# Patient Record
Sex: FEMALE | Race: WHITE | Employment: OTHER | URBAN - METROPOLITAN AREA
[De-identification: names, ages, dates, MRNs, and addresses within clinical notes are randomized per-mention and may not be internally consistent; named-entity substitution may affect disease eponyms.]

---

## 2017-04-07 ENCOUNTER — TELEPHONE (OUTPATIENT)
Dept: FAMILY MEDICINE CLINIC | Age: 67
End: 2017-04-07

## 2019-04-09 ENCOUNTER — OFFICE VISIT (OUTPATIENT)
Dept: FAMILY MEDICINE CLINIC | Age: 69
End: 2019-04-09

## 2019-04-09 VITALS
SYSTOLIC BLOOD PRESSURE: 122 MMHG | DIASTOLIC BLOOD PRESSURE: 64 MMHG | HEART RATE: 80 BPM | BODY MASS INDEX: 39.92 KG/M2 | RESPIRATION RATE: 14 BRPM | HEIGHT: 59 IN | WEIGHT: 198 LBS

## 2019-04-09 DIAGNOSIS — J20.9 ACUTE BRONCHITIS WITH BRONCHOSPASM: ICD-10-CM

## 2019-04-09 DIAGNOSIS — J01.20 ACUTE NON-RECURRENT ETHMOIDAL SINUSITIS: Primary | ICD-10-CM

## 2019-04-09 PROCEDURE — 4040F PNEUMOC VAC/ADMIN/RCVD: CPT | Performed by: FAMILY MEDICINE

## 2019-04-09 PROCEDURE — 99213 OFFICE O/P EST LOW 20 MIN: CPT | Performed by: FAMILY MEDICINE

## 2019-04-09 PROCEDURE — 1090F PRES/ABSN URINE INCON ASSESS: CPT | Performed by: FAMILY MEDICINE

## 2019-04-09 PROCEDURE — 1036F TOBACCO NON-USER: CPT | Performed by: FAMILY MEDICINE

## 2019-04-09 PROCEDURE — 3017F COLORECTAL CA SCREEN DOC REV: CPT | Performed by: FAMILY MEDICINE

## 2019-04-09 PROCEDURE — G8417 CALC BMI ABV UP PARAM F/U: HCPCS | Performed by: FAMILY MEDICINE

## 2019-04-09 PROCEDURE — G8400 PT W/DXA NO RESULTS DOC: HCPCS | Performed by: FAMILY MEDICINE

## 2019-04-09 PROCEDURE — G8427 DOCREV CUR MEDS BY ELIG CLIN: HCPCS | Performed by: FAMILY MEDICINE

## 2019-04-09 PROCEDURE — 1123F ACP DISCUSS/DSCN MKR DOCD: CPT | Performed by: FAMILY MEDICINE

## 2019-04-09 RX ORDER — AZITHROMYCIN 250 MG/1
250 TABLET, FILM COATED ORAL SEE ADMIN INSTRUCTIONS
Qty: 6 TABLET | Refills: 0 | Status: SHIPPED | OUTPATIENT
Start: 2019-04-09 | End: 2019-04-14

## 2019-04-09 RX ORDER — BENZONATATE 200 MG/1
200 CAPSULE ORAL 3 TIMES DAILY PRN
Qty: 30 CAPSULE | Refills: 0 | Status: SHIPPED | OUTPATIENT
Start: 2019-04-09 | End: 2019-04-16

## 2019-04-09 RX ORDER — FLUTICASONE FUROATE AND VILANTEROL 200; 25 UG/1; UG/1
POWDER RESPIRATORY (INHALATION)
Qty: 1 EACH | Refills: 0
Start: 2019-04-09 | End: 2020-05-21 | Stop reason: ALTCHOICE

## 2019-04-09 ASSESSMENT — ENCOUNTER SYMPTOMS
SORE THROAT: 0
CHEST TIGHTNESS: 0
EYE PAIN: 0
WHEEZING: 1
CONSTIPATION: 0
COUGH: 1
NAUSEA: 0
SHORTNESS OF BREATH: 0
ABDOMINAL PAIN: 0

## 2019-04-09 ASSESSMENT — PATIENT HEALTH QUESTIONNAIRE - PHQ9
1. LITTLE INTEREST OR PLEASURE IN DOING THINGS: 0
2. FEELING DOWN, DEPRESSED OR HOPELESS: 0
SUM OF ALL RESPONSES TO PHQ QUESTIONS 1-9: 0
SUM OF ALL RESPONSES TO PHQ QUESTIONS 1-9: 0
SUM OF ALL RESPONSES TO PHQ9 QUESTIONS 1 & 2: 0

## 2019-04-09 NOTE — PROGRESS NOTES
Subjective:      Patient ID: Vanda Kramer is a 71 y.o. female. Cough   This is a new problem. Episode onset: 4 to  5  days  The problem has been gradually worsening. The cough is productive of sputum ( clear  thick  sputum). Associated symptoms include ear congestion, nasal congestion, postnasal drip and wheezing. Pertinent negatives include no chest pain, ear pain, fever, headaches, rash, sore throat or shortness of breath. Associated symptoms comments:   Throat  irritated . She has tried OTC cough suppressant for the symptoms. The treatment provided mild relief. Review of Systems   Constitutional: Negative for appetite change, fatigue and fever. HENT: Positive for postnasal drip. Negative for congestion, ear pain and sore throat. Eyes: Negative for pain and visual disturbance. Respiratory: Positive for cough and wheezing. Negative for chest tightness and shortness of breath. Cardiovascular: Negative for chest pain, palpitations and leg swelling. Gastrointestinal: Negative for abdominal pain, constipation and nausea. Genitourinary: Negative for dysuria and frequency. Musculoskeletal: Negative for arthralgias, joint swelling, neck pain and neck stiffness. Skin: Negative for rash. Neurological: Negative for dizziness, weakness, numbness and headaches. Hematological: Negative for adenopathy. Does not bruise/bleed easily. Psychiatric/Behavioral: Negative for behavioral problems and sleep disturbance. The patient is not nervous/anxious.       Past Medical History:   Diagnosis Date    Aortic regurgitation     Diverticulosis of colon     Gastritis     GERD (gastroesophageal reflux disease)     IBS (irritable bowel syndrome)     Seasonal allergies     TMJ (dislocation of temporomandibular joint)    /64 (Site: Right Upper Arm, Position: Sitting, Cuff Size: Medium Adult)   Pulse 80   Resp 14   Ht 4' 11\" (1.499 m)   Wt 198 lb (89.8 kg)   BMI 39.99 kg/m²   Objective: Physical Exam   Constitutional: She is oriented to person, place, and time. She appears well-developed and well-nourished. HENT:   Head: Normocephalic and atraumatic. Right Ear: External ear normal.   Left Ear: External ear normal.   Nose: Nose normal.   Mouth/Throat: Oropharynx is clear and moist.   Ears are normal nasal admission congestion redness of the throat   Eyes: Pupils are equal, round, and reactive to light. Conjunctivae and EOM are normal. No scleral icterus. Neck: Normal range of motion. Neck supple. No JVD present. No thyromegaly present. Cardiovascular: Normal rate, regular rhythm, normal heart sounds and intact distal pulses. Pulmonary/Chest: Effort normal and breath sounds normal. She has no wheezes. She has no rales. Sensory clear with no wheezes although upper anteriorly there is some rhonchi that clears with cough   Abdominal: Soft. Bowel sounds are normal. She exhibits no distension and no mass. There is no tenderness. Musculoskeletal: Normal range of motion. She exhibits no tenderness. Lymphadenopathy:     She has no cervical adenopathy. Neurological: She is alert and oriented to person, place, and time. She has normal reflexes. No cranial nerve deficit. Skin: Skin is warm and dry. No rash noted. Psychiatric: She has a normal mood and affect. Nursing note and vitals reviewed. Assessment:       Diagnosis Orders   1. Acute non-recurrent ethmoidal sinusitis     2.  Acute bronchitis with bronchospasm           Plan:      Current Outpatient Medications   Medication Sig Dispense Refill    vitamin D (CHOLECALCIFEROL) 1000 UNIT TABS tablet Take 1,000 Units by mouth daily      benzonatate (TESSALON) 200 MG capsule Take 1 capsule by mouth 3 times daily as needed for Cough 30 capsule 0    Fluticasone Furoate-Vilanterol (BREO ELLIPTA) 200-25 MCG/INH AEPB One  Inhalation once a day 1 each 0    azithromycin (ZITHROMAX) 250 MG tablet Take 1 tablet by mouth See Admin Instructions for 5 days 500mg on day 1 followed by 250mg on days 2 - 5 6 tablet 0    Multiple Vitamins-Minerals (THERAPEUTIC MULTIVITAMIN-MINERALS) tablet Take 1 tablet by mouth daily.  calcium carbonate (OSCAL) 500 MG TABS tablet Take 500 mg by mouth daily.  Omega-3 Fatty Acids (FISH OIL) 1000 MG CAPS Take 3,000 mg by mouth 3 times daily.  Cinnamon 500 MG CAPS Take  by mouth.  Probiotic Product (PROBIOTIC & ACIDOPHILUS EX ST PO) Take  by mouth.  loratadine (CLARITIN) 10 MG tablet Take 10 mg by mouth daily as needed. No current facility-administered medications for this visit.           Sudafed  30 mg    For  congestion    Barbra Cabral MD

## 2019-09-06 ENCOUNTER — TELEPHONE (OUTPATIENT)
Dept: FAMILY MEDICINE CLINIC | Age: 69
End: 2019-09-06

## 2019-09-06 RX ORDER — AZITHROMYCIN 250 MG/1
TABLET, FILM COATED ORAL
Qty: 1 PACKET | Refills: 0 | Status: SHIPPED | OUTPATIENT
Start: 2019-09-06 | End: 2020-05-21 | Stop reason: ALTCHOICE

## 2020-05-21 ENCOUNTER — VIRTUAL VISIT (OUTPATIENT)
Dept: FAMILY MEDICINE CLINIC | Age: 70
End: 2020-05-21

## 2020-05-21 VITALS — HEIGHT: 59 IN | WEIGHT: 198 LBS | BODY MASS INDEX: 39.92 KG/M2

## 2020-05-21 PROCEDURE — G8400 PT W/DXA NO RESULTS DOC: HCPCS | Performed by: FAMILY MEDICINE

## 2020-05-21 PROCEDURE — 99213 OFFICE O/P EST LOW 20 MIN: CPT | Performed by: FAMILY MEDICINE

## 2020-05-21 PROCEDURE — 1090F PRES/ABSN URINE INCON ASSESS: CPT | Performed by: FAMILY MEDICINE

## 2020-05-21 PROCEDURE — G8427 DOCREV CUR MEDS BY ELIG CLIN: HCPCS | Performed by: FAMILY MEDICINE

## 2020-05-21 PROCEDURE — G8417 CALC BMI ABV UP PARAM F/U: HCPCS | Performed by: FAMILY MEDICINE

## 2020-05-21 PROCEDURE — 3017F COLORECTAL CA SCREEN DOC REV: CPT | Performed by: FAMILY MEDICINE

## 2020-05-21 PROCEDURE — 1123F ACP DISCUSS/DSCN MKR DOCD: CPT | Performed by: FAMILY MEDICINE

## 2020-05-21 PROCEDURE — 1036F TOBACCO NON-USER: CPT | Performed by: FAMILY MEDICINE

## 2020-05-21 PROCEDURE — 4040F PNEUMOC VAC/ADMIN/RCVD: CPT | Performed by: FAMILY MEDICINE

## 2020-05-21 RX ORDER — PYRIDOXINE HCL (VITAMIN B6) 100 MG
50 TABLET ORAL DAILY
COMMUNITY

## 2020-05-21 RX ORDER — LANOLIN ALCOHOL/MO/W.PET/CERES
1000 CREAM (GRAM) TOPICAL DAILY
Status: ON HOLD | COMMUNITY
End: 2022-02-04 | Stop reason: HOSPADM

## 2020-05-21 RX ORDER — DOXYCYCLINE HYCLATE 100 MG
100 TABLET ORAL 2 TIMES DAILY
Qty: 20 TABLET | Refills: 0 | Status: SHIPPED | OUTPATIENT
Start: 2020-05-21 | End: 2020-05-31

## 2020-05-21 ASSESSMENT — PATIENT HEALTH QUESTIONNAIRE - PHQ9
SUM OF ALL RESPONSES TO PHQ9 QUESTIONS 1 & 2: 0
2. FEELING DOWN, DEPRESSED OR HOPELESS: 0
SUM OF ALL RESPONSES TO PHQ QUESTIONS 1-9: 0
SUM OF ALL RESPONSES TO PHQ QUESTIONS 1-9: 0
1. LITTLE INTEREST OR PLEASURE IN DOING THINGS: 0

## 2020-05-21 ASSESSMENT — ENCOUNTER SYMPTOMS
WHEEZING: 0
NAUSEA: 0
EYE PAIN: 0
ABDOMINAL PAIN: 0
COUGH: 0
CONSTIPATION: 0
CHEST TIGHTNESS: 0
SORE THROAT: 0
SHORTNESS OF BREATH: 0

## 2020-05-21 NOTE — PROGRESS NOTES
nervous/anxious. Objective:   Physical Exam  Constitutional:       General: She is not in acute distress. Appearance: She is normal weight. She is not ill-appearing. Musculoskeletal: Normal range of motion. Skin:     Comments:     Right   axillary    Area  with  Rash and  redness and  Raised  middle and  then red  But  No  Bulls eye    Neurological:      General: No focal deficit present. Mental Status: She is alert and oriented to person, place, and time. Cranial Nerves: No cranial nerve deficit. Sensory: No sensory deficit. Motor: No weakness. Coordination: Coordination normal.         Assessment:       Diagnosis Orders   1. Insect bite of right front wall of thorax, initial encounter  doxycycline hyclate (VIBRA-TABS) 100 MG tablet         Plan:      Current Outpatient Medications   Medication Sig Dispense Refill    pyridoxine (B-6) 100 MG tablet Take 50 mg by mouth daily      vitamin B-12 (CYANOCOBALAMIN) 1000 MCG tablet Take 1,000 mcg by mouth daily      Multiple Vitamins-Minerals (EYE VITAMINS PO) Take 1 tablet by mouth daily      Turmeric Curcumin 500 MG CAPS Take 1 capsule by mouth daily      doxycycline hyclate (VIBRA-TABS) 100 MG tablet Take 1 tablet by mouth 2 times daily for 10 days 20 tablet 0    vitamin D (CHOLECALCIFEROL) 1000 UNIT TABS tablet Take 1,000 Units by mouth daily      Multiple Vitamins-Minerals (THERAPEUTIC MULTIVITAMIN-MINERALS) tablet Take 1 tablet by mouth daily.  calcium carbonate (OSCAL) 500 MG TABS tablet Take 500 mg by mouth daily.  Omega-3 Fatty Acids (FISH OIL) 1000 MG CAPS Take 3,000 mg by mouth 3 times daily.  Probiotic Product (PROBIOTIC & ACIDOPHILUS EX ST PO) Take  by mouth.  loratadine (CLARITIN) 10 MG tablet Take 10 mg by mouth daily as needed. No current facility-administered medications for this visit. No orders of the defined types were placed in this encounter.       See if  Persist and Take doxycycline   Roger Patterson MD

## 2020-09-21 ENCOUNTER — OFFICE VISIT (OUTPATIENT)
Dept: FAMILY MEDICINE CLINIC | Age: 70
End: 2020-09-21

## 2020-09-21 VITALS
TEMPERATURE: 96.8 F | WEIGHT: 197 LBS | DIASTOLIC BLOOD PRESSURE: 74 MMHG | BODY MASS INDEX: 39.72 KG/M2 | SYSTOLIC BLOOD PRESSURE: 136 MMHG | RESPIRATION RATE: 14 BRPM | HEIGHT: 59 IN | HEART RATE: 84 BPM

## 2020-09-21 PROBLEM — E66.01 MORBIDLY OBESE (HCC): Status: ACTIVE | Noted: 2020-09-21

## 2020-09-21 PROCEDURE — 4040F PNEUMOC VAC/ADMIN/RCVD: CPT | Performed by: FAMILY MEDICINE

## 2020-09-21 PROCEDURE — 1123F ACP DISCUSS/DSCN MKR DOCD: CPT | Performed by: FAMILY MEDICINE

## 2020-09-21 PROCEDURE — 3017F COLORECTAL CA SCREEN DOC REV: CPT | Performed by: FAMILY MEDICINE

## 2020-09-21 PROCEDURE — 99213 OFFICE O/P EST LOW 20 MIN: CPT | Performed by: FAMILY MEDICINE

## 2020-09-21 PROCEDURE — G0438 PPPS, INITIAL VISIT: HCPCS | Performed by: FAMILY MEDICINE

## 2020-09-21 RX ORDER — MOMETASONE FUROATE 1 MG/G
CREAM TOPICAL
Qty: 45 G | Refills: 1 | Status: ON HOLD | OUTPATIENT
Start: 2020-09-21 | End: 2022-02-04 | Stop reason: HOSPADM

## 2020-09-21 ASSESSMENT — ENCOUNTER SYMPTOMS
CHEST TIGHTNESS: 0
COUGH: 0
WHEEZING: 0
NAUSEA: 0
CONSTIPATION: 0
EYE PAIN: 0
ABDOMINAL PAIN: 0
SHORTNESS OF BREATH: 0
SORE THROAT: 0

## 2020-09-21 ASSESSMENT — PATIENT HEALTH QUESTIONNAIRE - PHQ9
2. FEELING DOWN, DEPRESSED OR HOPELESS: 0
1. LITTLE INTEREST OR PLEASURE IN DOING THINGS: 0
SUM OF ALL RESPONSES TO PHQ QUESTIONS 1-9: 0
SUM OF ALL RESPONSES TO PHQ9 QUESTIONS 1 & 2: 0
SUM OF ALL RESPONSES TO PHQ QUESTIONS 1-9: 0

## 2020-09-21 ASSESSMENT — LIFESTYLE VARIABLES: HOW OFTEN DO YOU HAVE A DRINK CONTAINING ALCOHOL: 0

## 2020-09-21 NOTE — PROGRESS NOTES
Medicare Annual Wellness Visit  Name: Dustin Matamoros Date: 2020   MRN: [id number] Sex: Female   Age: 79 y.o. Ethnicity: Non-/Non    : 1950 Race: Jayda Acosta is here for Medicare AWV    Screenings for behavioral, psychosocial and functional/safety risks, and cognitive dysfunction are all negative except as indicated below. These results, as well as other patient data from the [address] form, are documented in Flowsheets linked to this Encounter. Allergies   Allergen Reactions    Pcn [Penicillins] Hives    Shellfish-Derived Products Diarrhea and Nausea And Vomiting       Prior to Visit Medications    Medication Sig Taking? Authorizing Provider   pyridoxine (B-6) 100 MG tablet Take 50 mg by mouth daily Yes Historical Provider, MD   vitamin B-12 (CYANOCOBALAMIN) 1000 MCG tablet Take 1,000 mcg by mouth daily Yes Historical Provider, MD   Multiple Vitamins-Minerals (EYE VITAMINS PO) Take 1 tablet by mouth daily Yes Historical Provider, MD   Turmeric Curcumin 500 MG CAPS Take 1 capsule by mouth daily Yes Historical Provider, MD   vitamin D (CHOLECALCIFEROL) 1000 UNIT TABS tablet Take 1,000 Units by mouth daily Yes Historical Provider, MD   Multiple Vitamins-Minerals (THERAPEUTIC MULTIVITAMIN-MINERALS) tablet Take 1 tablet by mouth daily. Yes Historical Provider, MD   calcium carbonate (OSCAL) 500 MG TABS tablet Take 500 mg by mouth daily. Yes Historical Provider, MD   Omega-3 Fatty Acids (FISH OIL) 1000 MG CAPS Take 3,000 mg by mouth 3 times daily. Yes Historical Provider, MD   Probiotic Product (PROBIOTIC & ACIDOPHILUS EX ST PO) Take  by mouth. Yes Historical Provider, MD   loratadine (CLARITIN) 10 MG tablet Take 10 mg by mouth daily as needed.    Yes Historical Provider, MD       Past Medical History:   Diagnosis Date    Aortic regurgitation     Diverticulosis of colon     Gastritis     GERD (gastroesophageal reflux disease)     IBS (irritable bowel syndrome)     Seasonal allergies     TMJ (dislocation of temporomandibular joint)        Past Surgical History:   Procedure Laterality Date    APPENDECTOMY  1971     SECTION       SECTION  1979    COLONOSCOPY  2007    [place]    HYSTERECTOMY  1994    bso    MANDIBLE SURGERY         Family History   Problem Relation Age of Onset    Glaucoma Mother     Stroke Father     Cancer Father         colon       CareTeam (Including outside providers/suppliers regularly involved in providing care):   Patient Care Team:  Yifan Manrique MD as PCP - General (Family Medicine)  Yifan Manrique MD as PCP - [facility] Empaneled Provider    Wt Readings from Last 3 Encounters:   20 197 lb (89.4 kg)   20 198 lb (89.8 kg)   19 198 lb (89.8 kg)     Vitals:    20 1118   BP: 136/74   Site: Right Upper Arm   Position: Sitting   Cuff Size: Medium Adult   Pulse: 84   Resp: 14   Temp: 96.8 °F (36 °C)   TempSrc: Oral   Weight: 197 lb (89.4 kg)   Height: 4' 11\" (1.499 m)     Body mass index is 39.79 kg/m². Based upon direct observation of the patient, evaluation of cognition reveals recent and remote memory intact. Patient's complete Health Risk Assessment and screening values have been reviewed and are found in Flowsheets. The following problems were reviewed today and where indicated follow up appointments were made and/or referrals ordered. Positive Risk Factor Screenings with Interventions:     Health Habits/Nutrition:  Health Habits/Nutrition  Do you exercise for at least 20 minutes 2-3 times per week?: Yes  Have you lost any weight without trying in the past 3 months?: (!) Yes  Do you eat fewer than 2 meals per day?: No  Have you seen a dentist within the past year?: Yes  Body mass index is 39.79 kg/m².   Health Habits/Nutrition Interventions:  · activity  noted     Safety:  Safety  Do you have working smoke detectors?: Yes  Have all throw rugs been removed or fastened?: (!) No  Do you have non-slip mats or surfaces in all bathtubs/showers?: (!) No  Do all of your stairways have a railing or banister?: Yes  Are your doorways, halls and stairs free of clutter?: Yes  Do you always fasten your seatbelt when you are in a car?: Yes  Safety Interventions:  · Home safety tips provided  Need  Non slip    Personalized Preventive Plan   Current Health Maintenance Status  Immunization History   Administered Date(s) Administered    Hepatitis A 2013, 10/10/2013    Hepatitis B 2013, 2013, 10/10/2013    Tdap (Boostrix, Adacel) 2013        Health Maintenance   Topic Date Due    Hepatitis C screen  1950    Diabetes screen  1990    Breast cancer screen  2000    Shingles Vaccine (1 of 2) 2000    DEXA (modify frequency per FRAX score)  2005    Pneumococcal 65+ years Vaccine (1 of 1 - PPSV23) 2015    Colon Cancer Screen FIT/FOBT  2015    Lipid screen  2018    Annual Wellness Visit (AWV)  2019    Flu vaccine (1) 2020    DTaP/Tdap/Td vaccine (2 - Td) 2023    Hepatitis A vaccine  Aged Out    Hepatitis B vaccine  Aged Out    Hib vaccine  Aged Out    Meningococcal (ACWY) vaccine  Aged Out     Recommendations for [employer] Due: see orders and patient instructions/AVS.  . Recommended screening schedule for the next 5-10 years is provided to the patient in written form: see Patient Instructions/AVS.    There are no diagnoses linked to this encounter. dlclines  Mammogram and  Flu  Vaccine and fit test                2020     Savi Shultz (:  1950) is a 79 y.o. female, here for evaluation of the following medical concerns:    HPI    Does  mvi   Noted       Seasonal allergies  Sable a and  With  meds        gerd   Better       Review of Systems   Constitutional: Negative for appetite change, fatigue and fever.    HENT: Negative for congestion, ear pain, postnasal Smokeless tobacco: Never Used   Substance Use Topics    Alcohol use: No        Vitals:    09/21/20 1118   BP: 136/74   Site: Right Upper Arm   Position: Sitting   Cuff Size: Medium Adult   Pulse: 84   Resp: 14   Temp: 96.8 °F (36 °C)   TempSrc: Oral   Weight: 197 lb (89.4 kg)   Height: 4' 11\" (1.499 m)     Estimated body mass index is 39.79 kg/m² as calculated from the following:    Height as of this encounter: 4' 11\" (1.499 m). Weight as of this encounter: 197 lb (89.4 kg). Physical Exam  Vitals signs and nursing note reviewed. Constitutional:       Appearance: She is well-developed. HENT:      Head: Normocephalic and atraumatic. Right Ear: External ear normal.      Left Ear: External ear normal.      Nose: Nose normal.   Eyes:      General: No scleral icterus. Conjunctiva/sclera: Conjunctivae normal.      Pupils: Pupils are equal, round, and reactive to light. Neck:      Musculoskeletal: Normal range of motion and neck supple. Thyroid: No thyromegaly. Vascular: No JVD. Cardiovascular:      Rate and Rhythm: Normal rate and regular rhythm. Heart sounds: Normal heart sounds. Pulmonary:      Effort: Pulmonary effort is normal.      Breath sounds: Normal breath sounds. No wheezing or rales. Abdominal:      General: Bowel sounds are normal. There is no distension. Palpations: Abdomen is soft. There is no mass. Tenderness: There is no abdominal tenderness. Musculoskeletal: Normal range of motion. General: No tenderness. Lymphadenopathy:      Cervical: No cervical adenopathy. Skin:     General: Skin is warm and dry. Findings: No rash. Neurological:      Mental Status: She is alert and oriented to person, place, and time. Cranial Nerves: No cranial nerve deficit. Deep Tendon Reflexes: Reflexes are normal and symmetric. ASSESSMENT/PLAN:     Diagnosis Orders   1. Diverticulosis of colon     2. Morbidly obese ([address]     3. Gastroesophageal reflux disease without esophagitis     4. Irritable bowel syndrome without diarrhea     5. Seasonal allergies     6. Dislocation of temporomandibular joint, sequela       There are no diagnoses linked to this encounter. No follow-ups on file. PLAn    Current Outpatient Medications   Medication Sig Dispense Refill    pyridoxine (B-6) 100 MG tablet Take 50 mg by mouth daily      vitamin B-12 (CYANOCOBALAMIN) 1000 MCG tablet Take 1,000 mcg by mouth daily      Multiple Vitamins-Minerals (EYE VITAMINS PO) Take 1 tablet by mouth daily      Turmeric Curcumin 500 MG CAPS Take 1 capsule by mouth daily      vitamin D (CHOLECALCIFEROL) 1000 UNIT TABS tablet Take 1,000 Units by mouth daily      Multiple Vitamins-Minerals (THERAPEUTIC MULTIVITAMIN-MINERALS) tablet Take 1 tablet by mouth daily.  calcium carbonate (OSCAL) 500 MG TABS tablet Take 500 mg by mouth daily.  Omega-3 Fatty Acids (FISH OIL) 1000 MG CAPS Take 3,000 mg by mouth 3 times daily.  Probiotic Product (PROBIOTIC & ACIDOPHILUS EX ST PO) Take  by mouth.  loratadine (CLARITIN) 10 MG tablet Take 10 mg by mouth daily as needed. No current facility-administered medications for this visit. Stable and  See in one  Year      Cream  For  Skin  Mometasone   An electronic signature was used to authenticate this note.     --Lyn Quesada MD on 9/21/2020 at 12:09 PM

## 2020-09-21 NOTE — PATIENT INSTRUCTIONS
Personalized Preventive Plan for Ana Beams - 9/21/2020  Medicare offers a range of preventive health benefits. Some of the tests and screenings are paid in full while other may be subject to a deductible, co-insurance, and/or copay. Some of these benefits include a comprehensive review of your medical history including lifestyle, illnesses that may run in your family, and various assessments and screenings as appropriate. After reviewing your medical record and screening and assessments performed today your provider may have ordered immunizations, labs, imaging, and/or referrals for you. A list of these orders (if applicable) as well as your Preventive Care list are included within your After Visit Summary for your review. Other Preventive Recommendations:    · A preventive eye exam performed by an eye specialist is recommended every 1-2 years to screen for glaucoma; cataracts, macular degeneration, and other eye disorders. · A preventive dental visit is recommended every 6 months. · Try to get at least 150 minutes of exercise per week or 10,000 steps per day on a pedometer . · Order or download the FREE \"Exercise & Physical Activity: Your Everyday Guide\" from [employer]. Call 0-777.944.4376 or search [employer] online. · You need 5194-8064 mg of calcium and 8678-3151 IU of vitamin D per day. It is possible to meet your calcium requirement with diet alone, but a vitamin D supplement is usually necessary to meet this goal.  · When exposed to the sun, use a sunscreen that protects against both UVA and UVB radiation with an SPF of 30 or greater. Reapply every 2 to 3 hours or after sweating, drying off with a towel, or swimming. · Always wear a seat belt when traveling in a car. Always wear a helmet when riding a bicycle or motorcycle.

## 2021-01-29 ENCOUNTER — TELEPHONE (OUTPATIENT)
Dept: FAMILY MEDICINE CLINIC | Age: 71
End: 2021-01-29

## 2021-01-29 NOTE — TELEPHONE ENCOUNTER
Pt called requesting Ivermectin 3 mg, parasitic preventative medicine. Traveling down [facility]. Also want Doxycycline Rx.       [facility]

## 2021-01-30 NOTE — TELEPHONE ENCOUNTER
Some  Info on ivermectin for covid  Is this what wanting and the same for  Doxycycline ?   With the ability to do  Virtual visits feel best to do if symptoms so can instruct appropriately if getting ill

## 2022-01-24 ENCOUNTER — VIRTUAL VISIT (OUTPATIENT)
Dept: FAMILY MEDICINE CLINIC | Age: 72
End: 2022-01-24

## 2022-01-24 DIAGNOSIS — R11.0 NAUSEA: Primary | ICD-10-CM

## 2022-01-24 DIAGNOSIS — J06.9 UPPER RESPIRATORY TRACT INFECTION DUE TO COVID-19 VIRUS: ICD-10-CM

## 2022-01-24 DIAGNOSIS — A09 DIARRHEA OF INFECTIOUS ORIGIN: ICD-10-CM

## 2022-01-24 DIAGNOSIS — U07.1 UPPER RESPIRATORY TRACT INFECTION DUE TO COVID-19 VIRUS: ICD-10-CM

## 2022-01-24 PROCEDURE — G8400 PT W/DXA NO RESULTS DOC: HCPCS | Performed by: FAMILY MEDICINE

## 2022-01-24 PROCEDURE — 1036F TOBACCO NON-USER: CPT | Performed by: FAMILY MEDICINE

## 2022-01-24 PROCEDURE — 99213 OFFICE O/P EST LOW 20 MIN: CPT | Performed by: FAMILY MEDICINE

## 2022-01-24 PROCEDURE — G8427 DOCREV CUR MEDS BY ELIG CLIN: HCPCS | Performed by: FAMILY MEDICINE

## 2022-01-24 RX ORDER — ONDANSETRON 4 MG/1
4 TABLET, FILM COATED ORAL 3 TIMES DAILY PRN
Qty: 21 TABLET | Refills: 0 | Status: SHIPPED | OUTPATIENT
Start: 2022-01-24 | End: 2022-03-24 | Stop reason: SDUPTHER

## 2022-01-24 SDOH — ECONOMIC STABILITY: FOOD INSECURITY: WITHIN THE PAST 12 MONTHS, YOU WORRIED THAT YOUR FOOD WOULD RUN OUT BEFORE YOU GOT MONEY TO BUY MORE.: NEVER TRUE

## 2022-01-24 SDOH — ECONOMIC STABILITY: FOOD INSECURITY: WITHIN THE PAST 12 MONTHS, THE FOOD YOU BOUGHT JUST DIDN'T LAST AND YOU DIDN'T HAVE MONEY TO GET MORE.: NEVER TRUE

## 2022-01-24 ASSESSMENT — PATIENT HEALTH QUESTIONNAIRE - PHQ9
1. LITTLE INTEREST OR PLEASURE IN DOING THINGS: 0
SUM OF ALL RESPONSES TO PHQ QUESTIONS 1-9: 0
SUM OF ALL RESPONSES TO PHQ9 QUESTIONS 1 & 2: 0
SUM OF ALL RESPONSES TO PHQ QUESTIONS 1-9: 0
2. FEELING DOWN, DEPRESSED OR HOPELESS: 0
SUM OF ALL RESPONSES TO PHQ QUESTIONS 1-9: 0
SUM OF ALL RESPONSES TO PHQ QUESTIONS 1-9: 0

## 2022-01-24 ASSESSMENT — ENCOUNTER SYMPTOMS
SORE THROAT: 0
COUGH: 1
CHEST TIGHTNESS: 0
EYE PAIN: 0
ABDOMINAL PAIN: 0
NAUSEA: 0
SHORTNESS OF BREATH: 0
CONSTIPATION: 0
WHEEZING: 0

## 2022-01-24 ASSESSMENT — SOCIAL DETERMINANTS OF HEALTH (SDOH): HOW HARD IS IT FOR YOU TO PAY FOR THE VERY BASICS LIKE FOOD, HOUSING, MEDICAL CARE, AND HEATING?: NOT HARD AT ALL

## 2022-01-24 NOTE — PROGRESS NOTES
Lisa Hammonds (:  1950) is a Established patient, here for evaluation of the following:    Assessment & Plan         Diagnosis Orders   1. Nausea  ondansetron (ZOFRAN) 4 MG tablet   2. Diarrhea of infectious origin  ondansetron (ZOFRAN) 4 MG tablet   3. Upper respiratory tract infection due to COVID-19 virus  ondansetron (ZOFRAN) 4 MG tablet       PLAN    Current Outpatient Medications   Medication Sig Dispense Refill    ondansetron (ZOFRAN) 4 MG tablet Take 1 tablet by mouth 3 times daily as needed for Nausea or Vomiting 21 tablet 0    mometasone (ELOCON) 0.1 % cream Apply topically daily. (Patient not taking: Reported on 2022) 45 g 1    pyridoxine (B-6) 100 MG tablet Take 50 mg by mouth daily (Patient not taking: Reported on 2022)      vitamin B-12 (CYANOCOBALAMIN) 1000 MCG tablet Take 1,000 mcg by mouth daily (Patient not taking: Reported on 2022)      Multiple Vitamins-Minerals (EYE VITAMINS PO) Take 1 tablet by mouth daily (Patient not taking: Reported on 2022)      Turmeric Curcumin 500 MG CAPS Take 1 capsule by mouth daily (Patient not taking: Reported on 2022)      vitamin D (CHOLECALCIFEROL) 1000 UNIT TABS tablet Take 1,000 Units by mouth daily (Patient not taking: Reported on 2022)      Multiple Vitamins-Minerals (THERAPEUTIC MULTIVITAMIN-MINERALS) tablet Take 1 tablet by mouth daily. (Patient not taking: Reported on 2022)      calcium carbonate (OSCAL) 500 MG TABS tablet Take 500 mg by mouth daily. (Patient not taking: Reported on 2022)      Omega-3 Fatty Acids (FISH OIL) 1000 MG CAPS Take 3,000 mg by mouth 3 times daily. (Patient not taking: Reported on 2022)      Probiotic Product (PROBIOTIC & ACIDOPHILUS EX ST PO) Take  by mouth. (Patient not taking: Reported on 2022)      loratadine (CLARITIN) 10 MG tablet Take 10 mg by mouth daily as needed.    (Patient not taking: Reported on 2022)       No current facility-administered medications for this visit. No orders of the defined types were placed in this encounter. Told  Stay  isolated  Until  More  Symptoms  resolve and  get  Pulse  Ox and  If less  90%  To  er     Subjective     Fever  noted  101.5  range    Cough  This is a new problem. The current episode started in the past 7 days (  one  weekand  home  test ). The problem has been waxing and waning. The cough is non-productive. Associated symptoms include a fever, nasal congestion and postnasal drip. Pertinent negatives include no chest pain, ear pain, headaches, rash, sore throat, shortness of breath or wheezing. The treatment provided mild relief. Past Medical History:   Diagnosis Date    Aortic regurgitation     Diverticulosis of colon     Gastritis     GERD (gastroesophageal reflux disease)     IBS (irritable bowel syndrome)     Seasonal allergies     TMJ (dislocation of temporomandibular joint)          Review of Systems   Constitutional: Positive for fever. Negative for appetite change and fatigue. HENT: Positive for postnasal drip. Negative for congestion, ear pain and sore throat. Eyes: Negative for pain and visual disturbance. Respiratory: Positive for cough. Negative for chest tightness, shortness of breath and wheezing. Cardiovascular: Negative for chest pain, palpitations and leg swelling. Gastrointestinal: Negative for abdominal pain, constipation and nausea. lost    10  Pounds a dn     Still  Diarrhea    Genitourinary: Negative for dysuria and frequency. Musculoskeletal: Negative for arthralgias, joint swelling, neck pain and neck stiffness. Skin: Negative for rash. Neurological: Negative for dizziness, weakness, numbness and headaches. Hematological: Negative for adenopathy. Does not bruise/bleed easily. Psychiatric/Behavioral: Negative for behavioral problems and sleep disturbance. The patient is not nervous/anxious.            Objective   Patient-Reported Vitals  No data recorded     Physical Exam  [INSTRUCTIONS:  \"[x]\" Indicates a positive item  \"[]\" Indicates a negative item  -- DELETE ALL ITEMS NOT EXAMINED]    Constitutional: [x] Appears well-developed and well-nourished [x] No apparent distress      [] Abnormal -     Mental status: [x] Alert and awake  [x] Oriented to person/place/time [x] Able to follow commands    [] Abnormal -     Eyes:   EOM    [x]  Normal    [] Abnormal -   Sclera  [x]  Normal    [] Abnormal -          Discharge [x]  None visible   [] Abnormal -     HENT: [x] Normocephalic, atraumatic  [] Abnormal -   [x] Mouth/Throat: Mucous membranes are moist    appears  Dehydrated and  With  Cough and  congestion  External Ears [x] Normal  [] Abnormal -    Neck: [x] No visualized mass [] Abnormal -     Pulmonary/Chest: [x] Respiratory effort normal   [x] No visualized signs of difficulty breathing or respiratory distress        [] Abnormal -      Musculoskeletal:   [x] Normal gait with no signs of ataxia         [x] Normal range of motion of neck        [] Abnormal -     Neurological:        [x] No Facial Asymmetry (Cranial nerve 7 motor function) (limited exam due to video visit)          [x] No gaze palsy        [] Abnormal -          Skin:        [x] No significant exanthematous lesions or discoloration noted on facial skin         [] Abnormal -            Psychiatric:       [x] Normal Affect [] Abnormal -        [x] No Hallucinations    Other pertinent observable physical exam findings:-         Sherry Martinez, was evaluated through a synchronous (real-time) audio-video encounter. The patient (or guardian if applicable) is aware that this is a billable service, which includes applicable co-pays. This Virtual Visit was conducted with patient's (and/or legal guardian's) consent.  The visit was conducted pursuant to the emergency declaration under the [facility]5 waiver authority and the [facility] and Response Supplemental Appropriations Act. Patient identification was verified, and a caregiver was present when appropriate. The patient was located at home in a state where the provider was licensed to provide care.        --Juan Carlisle MD

## 2022-01-24 NOTE — PROGRESS NOTES
Tres Lee agreed to Video Chat/Exam in presence of Dr Agustin Rosales and myself. Verified who was present in room with Tres Lee. Tres Lee informed the e-mail address used to Face Time cannot be used to contact the Provider, if they are any questions or concerns they need to call the office directly. Tres Lee stated understanding.

## 2022-01-31 ENCOUNTER — APPOINTMENT (OUTPATIENT)
Dept: GENERAL RADIOLOGY | Age: 72
DRG: 310 | End: 2022-01-31
Payer: MEDICARE

## 2022-01-31 ENCOUNTER — HOSPITAL ENCOUNTER (INPATIENT)
Age: 72
LOS: 2 days | Discharge: HOME | DRG: 310 | End: 2022-02-04
Attending: HOSPITALIST | Admitting: HOSPITALIST
Payer: MEDICARE

## 2022-01-31 ENCOUNTER — VIRTUAL VISIT (OUTPATIENT)
Dept: FAMILY MEDICINE CLINIC | Age: 72
End: 2022-01-31

## 2022-01-31 DIAGNOSIS — E86.0 DEHYDRATION: Primary | ICD-10-CM

## 2022-01-31 DIAGNOSIS — R53.1 GENERALIZED WEAKNESS: ICD-10-CM

## 2022-01-31 DIAGNOSIS — F34.1 DYSTHYMIA: ICD-10-CM

## 2022-01-31 DIAGNOSIS — R26.81 GAIT INSTABILITY: ICD-10-CM

## 2022-01-31 DIAGNOSIS — I48.0 PAROXYSMAL ATRIAL FIBRILLATION (HCC): ICD-10-CM

## 2022-01-31 DIAGNOSIS — R53.1 GENERAL WEAKNESS: ICD-10-CM

## 2022-01-31 DIAGNOSIS — E86.0 DEHYDRATION: ICD-10-CM

## 2022-01-31 DIAGNOSIS — J12.82 PNEUMONIA DUE TO COVID-19 VIRUS: ICD-10-CM

## 2022-01-31 DIAGNOSIS — U07.1 COVID-19 VIRUS INFECTION: Primary | ICD-10-CM

## 2022-01-31 DIAGNOSIS — U07.1 PNEUMONIA DUE TO COVID-19 VIRUS: ICD-10-CM

## 2022-01-31 LAB
ALBUMIN SERPL-MCNC: 3.1 G/DL (ref 3.5–5.1)
ALP BLD-CCNC: 59 U/L (ref 38–126)
ALT SERPL-CCNC: 30 U/L (ref 11–66)
ANION GAP SERPL CALCULATED.3IONS-SCNC: 12 MEQ/L (ref 8–16)
AST SERPL-CCNC: 41 U/L (ref 5–40)
BASOPHILS # BLD: 0.2 %
BASOPHILS ABSOLUTE: 0 THOU/MM3 (ref 0–0.1)
BILIRUB SERPL-MCNC: 0.4 MG/DL (ref 0.3–1.2)
BILIRUBIN URINE: NEGATIVE
BLOOD, URINE: NEGATIVE
BUN BLDV-MCNC: 22 MG/DL (ref 7–22)
CALCIUM SERPL-MCNC: 8.3 MG/DL (ref 8.5–10.5)
CHARACTER, URINE: CLEAR
CHLORIDE BLD-SCNC: 107 MEQ/L (ref 98–111)
CO2: 21 MEQ/L (ref 23–33)
COLOR: YELLOW
CREAT SERPL-MCNC: 0.9 MG/DL (ref 0.4–1.2)
EOSINOPHIL # BLD: 0.9 %
EOSINOPHILS ABSOLUTE: 0.1 THOU/MM3 (ref 0–0.4)
ERYTHROCYTE [DISTWIDTH] IN BLOOD BY AUTOMATED COUNT: 13.2 % (ref 11.5–14.5)
ERYTHROCYTE [DISTWIDTH] IN BLOOD BY AUTOMATED COUNT: 41.3 FL (ref 35–45)
GFR SERPL CREATININE-BSD FRML MDRD: 62 ML/MIN/1.73M2
GLUCOSE BLD-MCNC: 117 MG/DL (ref 70–108)
GLUCOSE URINE: NEGATIVE MG/DL
HCT VFR BLD CALC: 43.3 % (ref 37–47)
HEMOGLOBIN: 14.3 GM/DL (ref 12–16)
IMMATURE GRANS (ABS): 0.02 THOU/MM3 (ref 0–0.07)
IMMATURE GRANULOCYTES: 0.4 %
KETONES, URINE: NEGATIVE
LEUKOCYTE ESTERASE, URINE: NEGATIVE
LYMPHOCYTES # BLD: 16.8 %
LYMPHOCYTES ABSOLUTE: 1 THOU/MM3 (ref 1–4.8)
MCH RBC QN AUTO: 28.4 PG (ref 26–33)
MCHC RBC AUTO-ENTMCNC: 33 GM/DL (ref 32.2–35.5)
MCV RBC AUTO: 85.9 FL (ref 81–99)
MONOCYTES # BLD: 12.4 %
MONOCYTES ABSOLUTE: 0.7 THOU/MM3 (ref 0.4–1.3)
NITRITE, URINE: NEGATIVE
NUCLEATED RED BLOOD CELLS: 0 /100 WBC
OSMOLALITY CALCULATION: 283.8 MOSMOL/KG (ref 275–300)
PH UA: 6.5 (ref 5–9)
PLATELET # BLD: 243 THOU/MM3 (ref 130–400)
PMV BLD AUTO: 10.6 FL (ref 9.4–12.4)
POTASSIUM SERPL-SCNC: 4.1 MEQ/L (ref 3.5–5.2)
PROTEIN UA: NEGATIVE
RBC # BLD: 5.04 MILL/MM3 (ref 4.2–5.4)
REASON FOR REJECTION: NORMAL
REJECTED TEST: NORMAL
SEG NEUTROPHILS: 69.3 %
SEGMENTED NEUTROPHILS ABSOLUTE COUNT: 4 THOU/MM3 (ref 1.8–7.7)
SODIUM BLD-SCNC: 140 MEQ/L (ref 135–145)
SPECIFIC GRAVITY, URINE: < 1.005 (ref 1–1.03)
TOTAL PROTEIN: 6.6 G/DL (ref 6.1–8)
TROPONIN T: < 0.01 NG/ML
UROBILINOGEN, URINE: 0.2 EU/DL (ref 0–1)
WBC # BLD: 5.7 THOU/MM3 (ref 4.8–10.8)

## 2022-01-31 PROCEDURE — 96361 HYDRATE IV INFUSION ADD-ON: CPT

## 2022-01-31 PROCEDURE — 99213 OFFICE O/P EST LOW 20 MIN: CPT | Performed by: FAMILY MEDICINE

## 2022-01-31 PROCEDURE — 80053 COMPREHEN METABOLIC PANEL: CPT

## 2022-01-31 PROCEDURE — 85025 COMPLETE CBC W/AUTO DIFF WBC: CPT

## 2022-01-31 PROCEDURE — 96360 HYDRATION IV INFUSION INIT: CPT

## 2022-01-31 PROCEDURE — 84484 ASSAY OF TROPONIN QUANT: CPT

## 2022-01-31 PROCEDURE — 99285 EMERGENCY DEPT VISIT HI MDM: CPT

## 2022-01-31 PROCEDURE — 1036F TOBACCO NON-USER: CPT | Performed by: FAMILY MEDICINE

## 2022-01-31 PROCEDURE — 81003 URINALYSIS AUTO W/O SCOPE: CPT

## 2022-01-31 PROCEDURE — 2580000003 HC RX 258: Performed by: PHYSICIAN ASSISTANT

## 2022-01-31 PROCEDURE — G8427 DOCREV CUR MEDS BY ELIG CLIN: HCPCS | Performed by: FAMILY MEDICINE

## 2022-01-31 PROCEDURE — 87635 SARS-COV-2 COVID-19 AMP PRB: CPT

## 2022-01-31 PROCEDURE — 36415 COLL VENOUS BLD VENIPUNCTURE: CPT

## 2022-01-31 PROCEDURE — G8400 PT W/DXA NO RESULTS DOC: HCPCS | Performed by: FAMILY MEDICINE

## 2022-01-31 PROCEDURE — 93005 ELECTROCARDIOGRAM TRACING: CPT | Performed by: PHYSICIAN ASSISTANT

## 2022-01-31 PROCEDURE — 71045 X-RAY EXAM CHEST 1 VIEW: CPT

## 2022-01-31 RX ORDER — SODIUM CHLORIDE, SODIUM LACTATE, POTASSIUM CHLORIDE, CALCIUM CHLORIDE 600; 310; 30; 20 MG/100ML; MG/100ML; MG/100ML; MG/100ML
INJECTION, SOLUTION INTRAVENOUS ONCE
Status: COMPLETED | OUTPATIENT
Start: 2022-01-31 | End: 2022-01-31

## 2022-01-31 RX ORDER — 0.9 % SODIUM CHLORIDE 0.9 %
1000 INTRAVENOUS SOLUTION INTRAVENOUS ONCE
Status: COMPLETED | OUTPATIENT
Start: 2022-01-31 | End: 2022-01-31

## 2022-01-31 RX ADMIN — SODIUM CHLORIDE 1000 ML: 9 INJECTION, SOLUTION INTRAVENOUS at 19:27

## 2022-01-31 RX ADMIN — SODIUM CHLORIDE, POTASSIUM CHLORIDE, SODIUM LACTATE AND CALCIUM CHLORIDE: 600; 310; 30; 20 INJECTION, SOLUTION INTRAVENOUS at 22:05

## 2022-01-31 ASSESSMENT — ENCOUNTER SYMPTOMS
BACK PAIN: 0
RHINORRHEA: 0
VOMITING: 1
SHORTNESS OF BREATH: 0
PHOTOPHOBIA: 0
SORE THROAT: 0
NAUSEA: 1
CONSTIPATION: 0
SHORTNESS OF BREATH: 1
ABDOMINAL PAIN: 0
DIARRHEA: 1
WHEEZING: 0
SORE THROAT: 0
CONSTIPATION: 0
COUGH: 0
EYE PAIN: 0
COUGH: 1
NAUSEA: 0
CHEST TIGHTNESS: 0
ABDOMINAL PAIN: 0

## 2022-01-31 ASSESSMENT — PAIN SCALES - WONG BAKER: WONGBAKER_NUMERICALRESPONSE: 2

## 2022-01-31 ASSESSMENT — PAIN DESCRIPTION - LOCATION: LOCATION: EAR

## 2022-01-31 ASSESSMENT — PAIN DESCRIPTION - ORIENTATION: ORIENTATION: LEFT

## 2022-01-31 NOTE — ED TRIAGE NOTES
Pt presents to the ED from home via EMS with c/o fatigue and diarrhea. Pt states she took a home covid test last week and tested positive. Pt states she has been feeling nauseous and had a lack of appetite since last week. Pt states she has episodes of diarrhea and vomiting. Pt states she has not vomited recently but her appetite has not increased. Pt states she is still having diarrhea. Pt states \"everything just goes through me\". Pt denies any SOB or chest pain. Pt states her right ear began to hurt yesterday. Pt states the pain is intermittent. Pt is unable to describe how pain feels.  RR even and unlabored

## 2022-01-31 NOTE — PROGRESS NOTES
Elidia Marshall (:  1950) is a Established patient, here for evaluation of the following:    Assessment & Plan        Diagnosis Orders   1. COVID-19 virus infection     2. Dehydration           PLAN. Current Outpatient Medications   Medication Sig Dispense Refill    ondansetron (ZOFRAN) 4 MG tablet Take 1 tablet by mouth 3 times daily as needed for Nausea or Vomiting 21 tablet 0    mometasone (ELOCON) 0.1 % cream Apply topically daily. (Patient not taking: Reported on 2022) 45 g 1    pyridoxine (B-6) 100 MG tablet Take 50 mg by mouth daily (Patient not taking: Reported on 2022)      vitamin B-12 (CYANOCOBALAMIN) 1000 MCG tablet Take 1,000 mcg by mouth daily (Patient not taking: Reported on 2022)      Multiple Vitamins-Minerals (EYE VITAMINS PO) Take 1 tablet by mouth daily (Patient not taking: Reported on 2022)      Turmeric Curcumin 500 MG CAPS Take 1 capsule by mouth daily (Patient not taking: Reported on 2022)      vitamin D (CHOLECALCIFEROL) 1000 UNIT TABS tablet Take 1,000 Units by mouth daily (Patient not taking: Reported on 2022)      Multiple Vitamins-Minerals (THERAPEUTIC MULTIVITAMIN-MINERALS) tablet Take 1 tablet by mouth daily. (Patient not taking: Reported on 2022)      calcium carbonate (OSCAL) 500 MG TABS tablet Take 500 mg by mouth daily. (Patient not taking: Reported on 2022)      Omega-3 Fatty Acids (FISH OIL) 1000 MG CAPS Take 3,000 mg by mouth 3 times daily. (Patient not taking: Reported on 2022)      Probiotic Product (PROBIOTIC & ACIDOPHILUS EX ST PO) Take  by mouth. (Patient not taking: Reported on 2022)      loratadine (CLARITIN) 10 MG tablet Take 10 mg by mouth daily as needed. (Patient not taking: Reported on 2022)       No current facility-administered medications for this visit.          Dehydration and to  Er   For  eval and  Dehydration and  Concern  of  RUBY    Subjective   HPI  patient with Cob[place] and cold last week and nd dehydration. Minimal urine output and persistent weakness with no oral intake      Review of Systems   Constitutional: Negative for appetite change, fatigue and fever. HENT: Negative for congestion, ear pain, postnasal drip and sore throat. Eyes: Negative for pain and visual disturbance. Respiratory: Positive for shortness of breath. Negative for cough, chest tightness and wheezing. Cardiovascular: Negative for chest pain, palpitations and leg swelling. Gastrointestinal: Negative for abdominal pain, constipation and nausea. Genitourinary: Negative for dysuria and frequency. Musculoskeletal: Negative for arthralgias, joint swelling, neck pain and neck stiffness. Skin: Negative for rash. Neurological: Positive for weakness. Negative for dizziness, numbness and headaches. Hematological: Negative for adenopathy. Does not bruise/bleed easily. Psychiatric/Behavioral: Negative for behavioral problems and sleep disturbance. The patient is not nervous/anxious.            Objective   Patient-Reported Vitals  No data recorded     Physical Exam  [INSTRUCTIONS:  \"[x]\" Indicates a positive item  \"[]\" Indicates a negative item  -- DELETE ALL ITEMS NOT EXAMINED]    Constitutional: [x] Appears well-developed and well-nourished [x] No apparent distress      [] Abnormal -     Mental status: [x] Alert and awake  [x] Oriented to person/place/time [x] Able to follow commands    [] Abnormal -     Eyes:   EOM    [x]  Normal    [] Abnormal -   Sclera  [x]  Normal    [] Abnormal -          Discharge [x]  None visible   [] Abnormal -     HENT: [x] Normocephalic, atraumatic  [] Abnormal -   [x] Mouth/Throat: Mucous membranes are moist    External Ears [x] Normal  [] Abnormal -    Neck: [x] No visualized mass [] Abnormal -     Pulmonary/Chest: [x] Respiratory effort normal   [x] No visualized signs of difficulty breathing or respiratory distress        [] Abnormal -      Musculoskeletal:   [x] Normal gait with no signs of ataxia         [x] Normal range of motion of neck        [] Abnormal -     Neurological:        [x] No Facial Asymmetry (Cranial nerve 7 motor function) (limited exam due to video visit)          [x] No gaze palsy        [] Abnormal -         Weakness   Skin:        [x] No significant exanthematous lesions or discoloration noted on facial skin         [] Abnormal -            Psychiatric:       [x] Normal Affect [] Abnormal -        [x] No Hallucinations    Other pertinent observable physical exam findings:-                 Elidia Marshall, was evaluated through a synchronous (real-time) audio-video encounter. The patient (or guardian if applicable) is aware that this is a billable service, which includes applicable co-pays. This Virtual Visit was conducted with patient's (and/or legal guardian's) consent. The visit was conducted pursuant to the emergency declaration under the [address] [place] [address], [address] [place] [address] authority and the [employer] General Act. Patient identification was verified, and a caregiver was present when appropriate. The patient was located at home in a state where the provider was licensed to provide care.        --Saritha Coburn MD

## 2022-01-31 NOTE — ED NOTES
Bed: 019A  Expected date: 1/31/22  Expected time: 6:18 PM  Means of arrival:   Comments:  Michi Benson RN  01/31/22 5275

## 2022-02-01 PROBLEM — R53.1 GENERALIZED WEAKNESS: Status: ACTIVE | Noted: 2022-02-01

## 2022-02-01 LAB
ADENOVIRUS F 40 41 PCR: NOT DETECTED
ALBUMIN SERPL-MCNC: 2.8 G/DL (ref 3.5–5.1)
ALP BLD-CCNC: 62 U/L (ref 38–126)
ALT SERPL-CCNC: 27 U/L (ref 11–66)
ANION GAP SERPL CALCULATED.3IONS-SCNC: 12 MEQ/L (ref 8–16)
AST SERPL-CCNC: 36 U/L (ref 5–40)
ASTROVIRUS PCR: NOT DETECTED
BILIRUB SERPL-MCNC: 0.4 MG/DL (ref 0.3–1.2)
BUN BLDV-MCNC: 17 MG/DL (ref 7–22)
CALCIUM SERPL-MCNC: 8.4 MG/DL (ref 8.5–10.5)
CAMPYLOBACTER PCR: NOT DETECTED
CHLORIDE BLD-SCNC: 105 MEQ/L (ref 98–111)
CLOSTRIDIUM DIFFICILE, PCR: NOT DETECTED
CO2: 21 MEQ/L (ref 23–33)
CREAT SERPL-MCNC: 0.9 MG/DL (ref 0.4–1.2)
CRYPTOSPORIDIUM PCR: NOT DETECTED
CYCLOSPORA CAYETANENSIS PCR: NOT DETECTED
E COLI 0157 PCR: NORMAL
E COLI ENTEROAGGREGATIVE PCR: NOT DETECTED
E COLI ENTEROPATHOGENIC PCR: NOT DETECTED
E COLI ENTEROTOXIGENIC PCR: NOT DETECTED
E COLI SHIGA LIKE TOXIN PCR: NOT DETECTED
E COLI SHIGELLA/ENTEROINVASIVE PCR: NOT DETECTED
E HISTOLYTICA GI FILM ARRAY: NOT DETECTED
EKG ATRIAL RATE: 91 BPM
EKG P AXIS: 59 DEGREES
EKG P-R INTERVAL: 170 MS
EKG Q-T INTERVAL: 346 MS
EKG QRS DURATION: 66 MS
EKG QTC CALCULATION (BAZETT): 425 MS
EKG R AXIS: -58 DEGREES
EKG T AXIS: 24 DEGREES
EKG VENTRICULAR RATE: 91 BPM
ERYTHROCYTE [DISTWIDTH] IN BLOOD BY AUTOMATED COUNT: 13.1 % (ref 11.5–14.5)
ERYTHROCYTE [DISTWIDTH] IN BLOOD BY AUTOMATED COUNT: 42.5 FL (ref 35–45)
GFR SERPL CREATININE-BSD FRML MDRD: 62 ML/MIN/1.73M2
GIARDIA LAMBLIA PCR: NOT DETECTED
GLUCOSE BLD-MCNC: 117 MG/DL (ref 70–108)
HCT VFR BLD CALC: 39.4 % (ref 37–47)
HEMOGLOBIN: 12.5 GM/DL (ref 12–16)
LACTIC ACID: 1.1 MMOL/L (ref 0.5–2)
MCH RBC QN AUTO: 28 PG (ref 26–33)
MCHC RBC AUTO-ENTMCNC: 31.7 GM/DL (ref 32.2–35.5)
MCV RBC AUTO: 88.3 FL (ref 81–99)
NOROVIRUS GI GII PCR: NOT DETECTED
PLATELET # BLD: 215 THOU/MM3 (ref 130–400)
PLESIOMONAS SHIGELLOIDES PCR: NOT DETECTED
PMV BLD AUTO: 10.2 FL (ref 9.4–12.4)
POTASSIUM REFLEX MAGNESIUM: 4.5 MEQ/L (ref 3.5–5.2)
RBC # BLD: 4.46 MILL/MM3 (ref 4.2–5.4)
ROTAVIRUS A PCR: NOT DETECTED
SALMONELLA PCR: NOT DETECTED
SAPOVIRUS PCR: NOT DETECTED
SARS-COV-2, NAAT: NOT  DETECTED
SODIUM BLD-SCNC: 138 MEQ/L (ref 135–145)
TOTAL PROTEIN: 6.3 G/DL (ref 6.1–8)
VIBRIO CHOLERAE PCR: NOT DETECTED
VIBRIO PCR: NOT DETECTED
WBC # BLD: 7.4 THOU/MM3 (ref 4.8–10.8)
YERSINIA ENTEROCOLITICA PCR: NOT DETECTED

## 2022-02-01 PROCEDURE — 85027 COMPLETE CBC AUTOMATED: CPT

## 2022-02-01 PROCEDURE — 99220 PR INITIAL OBSERVATION CARE/DAY 70 MINUTES: CPT | Performed by: HOSPITALIST

## 2022-02-01 PROCEDURE — 96372 THER/PROPH/DIAG INJ SC/IM: CPT

## 2022-02-01 PROCEDURE — 83605 ASSAY OF LACTIC ACID: CPT

## 2022-02-01 PROCEDURE — 96361 HYDRATE IV INFUSION ADD-ON: CPT

## 2022-02-01 PROCEDURE — G0378 HOSPITAL OBSERVATION PER HR: HCPCS

## 2022-02-01 PROCEDURE — 96375 TX/PRO/DX INJ NEW DRUG ADDON: CPT

## 2022-02-01 PROCEDURE — 87177 OVA AND PARASITES SMEARS: CPT

## 2022-02-01 PROCEDURE — 6370000000 HC RX 637 (ALT 250 FOR IP): Performed by: INTERNAL MEDICINE

## 2022-02-01 PROCEDURE — 6370000000 HC RX 637 (ALT 250 FOR IP): Performed by: NURSE PRACTITIONER

## 2022-02-01 PROCEDURE — 2580000003 HC RX 258: Performed by: FAMILY MEDICINE

## 2022-02-01 PROCEDURE — 99233 SBSQ HOSP IP/OBS HIGH 50: CPT | Performed by: INTERNAL MEDICINE

## 2022-02-01 PROCEDURE — 80053 COMPREHEN METABOLIC PANEL: CPT

## 2022-02-01 PROCEDURE — 36415 COLL VENOUS BLD VENIPUNCTURE: CPT

## 2022-02-01 PROCEDURE — 2580000003 HC RX 258: Performed by: HOSPITALIST

## 2022-02-01 PROCEDURE — 6360000002 HC RX W HCPCS: Performed by: HOSPITALIST

## 2022-02-01 PROCEDURE — 0097U HC GI PTHGN MULT REV TRANS & AMP PRB TECH 22 TRGT: CPT

## 2022-02-01 RX ORDER — ACETAMINOPHEN 650 MG/1
650 SUPPOSITORY RECTAL EVERY 6 HOURS PRN
Status: DISCONTINUED | OUTPATIENT
Start: 2022-02-01 | End: 2022-02-04 | Stop reason: HOSPADM

## 2022-02-01 RX ORDER — ONDANSETRON 2 MG/ML
4 INJECTION INTRAMUSCULAR; INTRAVENOUS EVERY 6 HOURS PRN
Status: DISCONTINUED | OUTPATIENT
Start: 2022-02-01 | End: 2022-02-04 | Stop reason: HOSPADM

## 2022-02-01 RX ORDER — PANTOPRAZOLE SODIUM 40 MG/1
40 TABLET, DELAYED RELEASE ORAL
Status: DISCONTINUED | OUTPATIENT
Start: 2022-02-01 | End: 2022-02-04 | Stop reason: HOSPADM

## 2022-02-01 RX ORDER — POLYETHYLENE GLYCOL 3350 17 G/17G
17 POWDER, FOR SOLUTION ORAL DAILY PRN
Status: DISCONTINUED | OUTPATIENT
Start: 2022-02-01 | End: 2022-02-04 | Stop reason: HOSPADM

## 2022-02-01 RX ORDER — SODIUM CHLORIDE 9 MG/ML
25 INJECTION, SOLUTION INTRAVENOUS PRN
Status: DISCONTINUED | OUTPATIENT
Start: 2022-02-01 | End: 2022-02-04 | Stop reason: HOSPADM

## 2022-02-01 RX ORDER — DEXTROSE AND SODIUM CHLORIDE 5; .9 G/100ML; G/100ML
INJECTION, SOLUTION INTRAVENOUS CONTINUOUS
Status: DISCONTINUED | OUTPATIENT
Start: 2022-02-01 | End: 2022-02-04 | Stop reason: HOSPADM

## 2022-02-01 RX ORDER — ACETAMINOPHEN 325 MG/1
650 TABLET ORAL EVERY 6 HOURS PRN
Status: DISCONTINUED | OUTPATIENT
Start: 2022-02-01 | End: 2022-02-04 | Stop reason: HOSPADM

## 2022-02-01 RX ORDER — SODIUM CHLORIDE 0.9 % (FLUSH) 0.9 %
10 SYRINGE (ML) INJECTION PRN
Status: DISCONTINUED | OUTPATIENT
Start: 2022-02-01 | End: 2022-02-04 | Stop reason: HOSPADM

## 2022-02-01 RX ORDER — ONDANSETRON 4 MG/1
4 TABLET, ORALLY DISINTEGRATING ORAL EVERY 8 HOURS PRN
Status: DISCONTINUED | OUTPATIENT
Start: 2022-02-01 | End: 2022-02-04 | Stop reason: HOSPADM

## 2022-02-01 RX ORDER — DIPHENHYDRAMINE HCL 25 MG
50 TABLET ORAL EVERY 6 HOURS PRN
Status: DISCONTINUED | OUTPATIENT
Start: 2022-02-01 | End: 2022-02-04 | Stop reason: HOSPADM

## 2022-02-01 RX ORDER — SODIUM CHLORIDE 0.9 % (FLUSH) 0.9 %
10 SYRINGE (ML) INJECTION EVERY 12 HOURS SCHEDULED
Status: DISCONTINUED | OUTPATIENT
Start: 2022-02-01 | End: 2022-02-04 | Stop reason: HOSPADM

## 2022-02-01 RX ADMIN — SODIUM CHLORIDE, PRESERVATIVE FREE 10 ML: 5 INJECTION INTRAVENOUS at 21:08

## 2022-02-01 RX ADMIN — METOPROLOL TARTRATE 12.5 MG: 25 TABLET, FILM COATED ORAL at 17:24

## 2022-02-01 RX ADMIN — DIPHENHYDRAMINE HYDROCHLORIDE 50 MG: 25 TABLET ORAL at 00:32

## 2022-02-01 RX ADMIN — SODIUM CHLORIDE, PRESERVATIVE FREE 10 ML: 5 INJECTION INTRAVENOUS at 10:23

## 2022-02-01 RX ADMIN — ENOXAPARIN SODIUM 40 MG: 100 INJECTION SUBCUTANEOUS at 10:23

## 2022-02-01 RX ADMIN — DEXTROSE AND SODIUM CHLORIDE: 5; 900 INJECTION, SOLUTION INTRAVENOUS at 21:08

## 2022-02-01 ASSESSMENT — PAIN SCALES - GENERAL: PAINLEVEL_OUTOF10: 0

## 2022-02-01 NOTE — ED PROVIDER NOTES
[facility] [address] [address] EMERGENCY DEPT      CHIEF COMPLAINT       Chief Complaint   Patient presents with    Nausea    Fatigue    Positive For Covid-19       Nurses Notes reviewed and I agree except as noted in the HPI. HISTORY OF PRESENT ILLNESS    Rajesh Pompa is a 67 y.o. female who presents for progressive weakness in the setting of COVID. Patient has had COVID 19 for 2 weeks. Symptoms include cough, fevers, decrease in appetite, decrease in oral intake with subsequent decrease in urination, nausea, vomiting, diarrhea, postural lightheadedness, and progressive weakness. Patient reports she feels like she has \"cot[place]mouth. \"  Patient has tried a brat diet and Zofran called in by her physician, but she is not taking in very much and what she does get in \"comes right out. \"  At 4:30 PM patient had a telehealth visit with her PCP who advised she come to the ER due to dehydration and concern for kidney injury. Patient denies chest pain, dyspnea, abdominal pain, UTI symptoms, headache, dizziness, or other complaints. Patient is a non-smoker. Patient was not vaccinated against COVID. REVIEW OF SYSTEMS     Review of Systems   Constitutional: Positive for appetite change, fatigue and fever. Negative for chills. HENT: Negative for congestion, ear pain, rhinorrhea and sore throat. Eyes: Negative for photophobia. Respiratory: Positive for cough. Negative for shortness of breath. Cardiovascular: Negative for chest pain. Gastrointestinal: Positive for diarrhea, nausea and vomiting. Negative for abdominal pain and constipation. Endocrine: Negative for polyuria. Genitourinary: Positive for decreased urine volume. Negative for difficulty urinating, dysuria, flank pain and frequency. Musculoskeletal: Negative for back pain. Skin: Negative for rash. Neurological: Positive for weakness and light-headedness. Negative for dizziness, numbness and headaches. Psychiatric/Behavioral: Negative for confusion. PAST MEDICAL HISTORY    has a past medical history of Aortic regurgitation, Diverticulosis of colon, Gastritis, GERD (gastroesophageal reflux disease), IBS (irritable bowel syndrome), Seasonal allergies, and TMJ (dislocation of temporomandibular joint). SURGICAL HISTORY      has a past surgical history that includes Appendectomy ();  section ();  section (); Mandible surgery; Hysterectomy (1994); and Colonoscopy (). CURRENT MEDICATIONS       Previous Medications    CALCIUM CARBONATE (OSCAL) 500 MG TABS TABLET    Take 500 mg by mouth daily. LORATADINE (CLARITIN) 10 MG TABLET    Take 10 mg by mouth daily as needed. MOMETASONE (ELOCON) 0.1 % CREAM    Apply topically daily. MULTIPLE VITAMINS-MINERALS (EYE VITAMINS PO)    Take 1 tablet by mouth daily    MULTIPLE VITAMINS-MINERALS (THERAPEUTIC MULTIVITAMIN-MINERALS) TABLET    Take 1 tablet by mouth daily. OMEGA-3 FATTY ACIDS (FISH OIL) 1000 MG CAPS    Take 3,000 mg by mouth 3 times daily. ONDANSETRON (ZOFRAN) 4 MG TABLET    Take 1 tablet by mouth 3 times daily as needed for Nausea or Vomiting    PROBIOTIC PRODUCT (PROBIOTIC & ACIDOPHILUS EX ST PO)    Take  by mouth. PYRIDOXINE (B-6) 100 MG TABLET    Take 50 mg by mouth daily    TURMERIC CURCUMIN 500 MG CAPS    Take 1 capsule by mouth daily    VITAMIN B-12 (CYANOCOBALAMIN) 1000 MCG TABLET    Take 1,000 mcg by mouth daily    VITAMIN D (CHOLECALCIFEROL) 1000 UNIT TABS TABLET    Take 1,000 Units by mouth daily       ALLERGIES     is allergic to pcn [penicillins] and shellfish-derived products. FAMILY HISTORY     She indicated that her mother is alive. She indicated that her father is . family history includes Cancer in her father; Glaucoma in her mother; Stroke in her father. SOCIAL HISTORY    reports that she has never smoked. She has never used smokeless tobacco. She reports that she does not drink alcohol and does not use drugs.     PHYSICAL EXAM     INITIAL VITALS:  height is 4' 11\" (1.499 m) and weight is 180 lb (81.6 kg). Her oral temperature is 98.4 °F (36.9 °C). Her blood pressure is 101/49 (abnormal) and her pulse is 90. Her respiration is 24 and oxygen saturation is 96%. Physical Exam  Constitutional:       Appearance: Normal appearance. She is well-developed. She is morbidly obese. She is not ill-appearing or diaphoretic. HENT:      Head: Normocephalic and atraumatic. Right Ear: Hearing normal.      Left Ear: Hearing normal.      Nose: Nose normal. No rhinorrhea. Mouth/Throat:      Lips: Pink. Mouth: Mucous membranes are moist.      Pharynx: Oropharynx is clear. Eyes:      General: Lids are normal. No scleral icterus. Extraocular Movements: Extraocular movements intact. Conjunctiva/sclera: Conjunctivae normal.      Pupils: Pupils are equal, round, and reactive to light. Neck:      Trachea: Trachea normal.   Cardiovascular:      Rate and Rhythm: Normal rate and regular rhythm. Heart sounds: Normal heart sounds. No murmur heard. Pulmonary:      Effort: Pulmonary effort is normal.      Breath sounds: Normal air entry. Examination of the left-lower field reveals rhonchi. Rhonchi present. No decreased breath sounds or wheezing. Abdominal:      General: There is no distension. Palpations: Abdomen is soft. Tenderness: There is no abdominal tenderness. Musculoskeletal:      Cervical back: Normal range of motion and neck supple. No rigidity. Comments: Well perfused; movement normal as observed; no signs of DVT   Lymphadenopathy:      Cervical: No cervical adenopathy. Skin:     General: Skin is warm and dry. Findings: No rash. Neurological:      General: No focal deficit present. Mental Status: She is alert. Sensory: Sensation is intact. Motor: Motor function is intact. Gait: Gait is intact.    Psychiatric:         Mood and Affect: Mood normal.         Speech: Speech limits   CBC WITH AUTO DIFFERENTIAL   SPECIMEN REJECTION   TROPONIN   ANION GAP   OSMOLALITY   URINE RT REFLEX TO CULTURE       EMERGENCY DEPARTMENT COURSE:   Vitals:    Vitals:    01/31/22 1830 01/31/22 1928 01/31/22 2008 01/31/22 2107   BP: 135/84 129/66 132/64 (!) 101/49   Pulse: 95 92 87 90   Resp: 24 21 22 24   Temp: 98.4 °F (36.9 °C)      TempSrc: Oral      SpO2: 94% 94% 97% 96%   Weight: 180 lb (81.6 kg)      Height: 4' 11\" (1.499 m)          Patient was seen in the emergency department during the global pandemic, when there was surge capacity and regional healthcare crisis. MDM:  The patient was seen by me in the emergency room for progressive weakness and dehydration in the setting of COVID. Physical exam revealed a nontoxic-appearing 55-year-old female with rhonchi in the left base. She appears generally weak with dry mucous membranes    Vital signs reviewed and noted stable. Old records were reviewed. Appropriate laboratory and imaging studies were ordered and reviewed upon completion. Pertinent findings: Multifocal patchy infiltrates scattered throughout both lungs consistent with Covid pneumonia; labs unremarkable. Interventions: Saline, lactated Ringer's    Reexamination: Attempt to ambulate patient with a pulse ox was unsuccessful. Patient was too weak to stand and walk. Vital signs otherwise remained stable. Results were discussed with the patient and family as well as desire for admission and they were amenable. Dr. Christiano Sullivan of our hospitalists' service was consulted and graciously accepted admission. The patient was admitted to the hospital in fair condition. CRITICAL CARE:   None    CONSULTS:  4355: Dr. Christiano Sullivan (hospitalist)    PROCEDURES:  None    FINAL IMPRESSION      1. Dehydration    2. General weakness    3. Pneumonia due to COVID-19 virus    4. Gait instability          DISPOSITION/PLAN     1. Dehydration    2. General weakness    3. Pneumonia due to COVID-19 virus    4.  Gait

## 2022-02-01 NOTE — ED NOTES
Pt resting on cot with family at the bedside. No needs voiced at this time.       [address], RN  02/01/22 2447

## 2022-02-01 NOTE — ED NOTES
Pt and vs reassessed. RR even and unlabored. Pt resting in bed alert. Pt given food and denies any other needs. No distress noted.  Pt stable at this time     [facility]  01/31/22 2110

## 2022-02-01 NOTE — PROGRESS NOTES
During my assessment my patients respirations were unlabored. No signs of SOB. Cough was present but non-productive. Lung sounds were clear. BP was 119/80. Pulse was 153. Nurse was notified and she notified Doctor. RR is 18. Temp 97.7. O2 Sat 92. Bowel sounds active x4. Abdomen is round and nontender. Skin is pink, warm and dry. Pupils were equal round and reactive from a 4mm to a 2mm. She is A/O x4. Complains of no pain. Skin turgor <3. Cap refill <3. Hand grasp strong and equal. Arm drift negative. No numbness or tingling in either upper or lower extremities. No edema over bone. Pedal push and pull are strong and equal. IV capped in L hand.

## 2022-02-01 NOTE — ED NOTES
Pt and vs reassessed. RR even and unlabored. Pt resting in bed alert. Pt requesting benadryl. This RN notified Hanh Notice NP. Pt denies any other needs. No distress noted.  Pt stable at this time     Héctor Deras, [address] [place] [address]  02/01/22 0002

## 2022-02-01 NOTE — H&P
Hospitalist - History & Physical      Patient: Saeed Yun    Unit/Bed:5K-07/007-A  YOB: 1950  MRN: [id number]   Acct: [de-identified]   PCP: Juan Sequeira MD    Date of Service: Pt seen/examined on 02/01/22  and Admitted to Observation with expected LOS less than two midnights due to medical therapy. Chief Complaint: Generalized weakness, diarrhea    Assessment and Plan:-  1. Generalized weakness -unclear etiology. Possibly associated with dehydration and diarrhea. Possibly more associated with anxiety and recent loss of daughter due to COVID-19.  2. Diarrhea -patient states that she has been having diarrhea for 2 to 3 weeks. Stool studies ordered for further evaluation. 3. History of irritable bowel syndrome  4. History of GERD -order Protonix  5. Recent COVID-19 -patient symptom is not consistent with prolonged COVID-19 symptoms. Patient's COVID-19 test is negative today. History Of Present Illness: This is a 51-year-old female with past medical history significant for GERD, IBS, TMJ, gastritis, aortic regurgitation presenting with generalized weakness for 2 weeks. Patient was too weak to get around at home and decided to come to the emergency room for evaluation. History was provided by patient and family members who were at bedside. Patient was having nonspecific symptoms including mild cough, diarrhea and vomiting about 2 weeks ago. Patient symptoms continued and patient had tested positive at home with COVID-19 about a week ago. Patient's diarrhea did not stop and patient was getting weaker and weaker. Patient then decided to come to emergency room for evaluation. Patient's Covid test was negative in the emergency room. Patient went to bathroom and had bowel movements for at least 3 times during ER stay. Patient was given 1 L of fluid in the emergency room. Blood work was overall unremarkable.   Patient and patient's family member felt uncomfortable going back  Probiotic Product (PROBIOTIC & ACIDOPHILUS EX ST PO) Take  by mouth. (Patient not taking: Reported on 1/24/2022)      loratadine (CLARITIN) 10 MG tablet Take 10 mg by mouth daily as needed. (Patient not taking: Reported on 1/24/2022)         Allergies:    Pcn [penicillins] and Shellfish-derived products    Social History:    reports that she has never smoked. She has never used smokeless tobacco. She reports that she does not drink alcohol and does not use drugs. Family History:       Problem Relation Age of Onset    Glaucoma Mother     Stroke Father     Cancer Father         colon       Diet:  ADULT DIET; Regular    Review of systems:   Pertinent positives as noted in the HPI. All other systems reviewed and negative. PHYSICAL EXAM:  BP (!) 143/64   Pulse 95   Temp 98.4 °F (36.9 °C) (Oral)   Resp 20   Ht 4' 11\" (1.499 m)   Wt 180 lb (81.6 kg)   SpO2 92%   BMI 36.36 kg/m²   General appearance: No apparent distress, appears stated age and cooperative. HEENT: Normal cephalic, atraumatic without obvious deformity. Extra ocular muscles intact. Conjunctivae/corneas clear. Neck: Supple, with full range of motion. No jugular venous distention. Trachea midline. Respiratory:  Normal respiratory effort. Cardiovascular: Regular rate and rhythm with normal S1/S2. Abdomen: Soft, non-tender, non-distended with normal bowel sounds. Musculoskeletal:  No clubbing, cyanosis or edema bilaterally. Skin: Skin color, texture, turgor normal.  No rashes or lesions.   Neurologic:  Cranial nerves: II-XII intact, grossly non-focal.  Psychiatric: Alert and oriented, thought content appropriate, normal insight  Capillary Refill: Brisk,< 3 seconds   Peripheral Pulses: +2 palpable, equal bilaterally     Labs:   Recent Labs     01/31/22 1920   WBC 5.7   HGB 14.3   HCT 43.3        Recent Labs     01/31/22 2032      K 4.1      CO2 21*   BUN 22   CREATININE 0.9   CALCIUM 8.3*     Recent Labs

## 2022-02-01 NOTE — ED NOTES
Upon first contact with patient this RN receives bedside shift report from RO Poole. Pt voices no needs at this time.       Mary Son RN  02/01/22 1997

## 2022-02-01 NOTE — PROGRESS NOTES
Hospitalist Progress Note  [facility] 5K       Patient: Pablito Lovelace  Unit/Bed: 5K-07/007-A  YOB: 1950  MRN: [id number]  Acct: [de-identified]   AdmittingDiagnosis: Dehydration [E86.0]  Gait instability [R26.81]  General weakness [R53.1]  Generalized weakness [R53.1]  Pneumonia due to COVID-19 virus [U07.1, J12.82]  Admit Date: 1/31/2022  Hospital Day: 0    Subjective:    Reports of continued intermittent diarrhea no bleeding in the stool noted    Objective:   /72   Pulse 88   Temp 98.2 °F (36.8 °C) (Oral)   Resp 18   Ht 4' 11\" (1.499 m)   Wt 180 lb (81.6 kg)   SpO2 94%   BMI 36.36 kg/m²     Intake/Output Summary (Last 24 hours) at 2/1/2022 1424  Last data filed at 2/1/2022 0450  Gross per 24 hour   Intake 150 ml   Output --   Net 150 ml     Physical Exam    General appearance: No apparent distress, appears stated age and cooperative. HEENT: Normal cephalic, atraumatic without obvious deformity. Extra ocular muscles intact. Conjunctivae/corneas clear. Neck: Supple, with full range of motion. No jugular venous distention. Trachea midline. Respiratory:  Normal respiratory effort. Cardiovascular: Regular rate and rhythm with normal S1/S2. Abdomen: Soft, non-tender, non-distended with normal bowel sounds. Musculoskeletal:  No clubbing, cyanosis or edema bilaterally. Skin: Skin color, texture, turgor normal.  No rashes or lesions.   Neurologic:  Cranial nerves: II-XII intact, grossly non-focal.  Psychiatric: Alert and oriented, thought content appropriate, normal insight  Capillary Refill: Brisk,< 3 seconds   Peripheral Pulses: +2 palpable, equal bilaterally     DVT Prophylaxis: Lovenox     Data:  CBC:   Lab Results   Component Value Date    WBC 7.4 02/01/2022    HGB 12.5 02/01/2022    HCT 39.4 02/01/2022    MCV 88.3 02/01/2022     02/01/2022     BMP:   Lab Results   Component Value Date     02/01/2022    K 4.5 02/01/2022     02/01/2022    CO2 21 02/01/2022 BUN 17 02/01/2022    CREATININE 0.9 02/01/2022    CALCIUM 8.4 02/01/2022     ABGs: No results found for: PH, PCO2, PO2, HCO3, O2SAT  Troponin: No results found for: TROPONINI   LFTs   Lab Results   Component Value Date    AST 36 02/01/2022    ALT 27 02/01/2022    BILITOT 0.4 02/01/2022    ALKPHOS 62 02/01/2022          Imaging   XR CHEST PORTABLE    Result Date: 1/31/2022  PROCEDURE: XR CHEST PORTABLE CLINICAL INFORMATION: cough covid COMPARISON: No prior study. TECHNIQUE: A single mobile view of the chest was obtained. 1. Normal heart size. No effusion. 2. Relatively mild Multifocal patchy groundglass infiltrates scattered in both lungs, consistent with Covid pneumonia. **This report has been created using voice recognition software. It may contain minor errors which are inherent in voice recognition technology. ** Final report electronically signed by Dr. Selin Amin on 1/31/2022 7:38 PM      Assessment/Plan:    1. Generalized weakness -unclear etiology. Possibly associated with dehydration and diarrhea.  will check Mag and Phosphorus levels   Consult Pt/OT     2. Diarrhea  for 2 to 3 weeks. Stool studies negative     3. History of irritable bowel syndrome, monitor     4. History of GERD -cont. Protonix    5. Recent COVID-19 -patient symptom is not consistent with prolonged COVID-19 symptoms.   and tested  negative         Electronically signed by Kavon Costa MD on 2/1/2022 at 2:24 PM    [facility] Hospitalist

## 2022-02-01 NOTE — CARE COORDINATION
2/1/22, 10:38 AM EST    DISCHARGE ON GOING EVALUATION    [facility] day: 0  Location: 5K-07/007-A Reason for admit: Dehydration [E86.0]  Gait instability [R26.81]  General weakness [R53.1]  Generalized weakness [R53.1]  Pneumonia due to COVID-19 virus [U07.1, J12.82]   Procedure: N/A  Barriers to Discharge: Patient presents with weakness, decreased appetite, nausea, vomiting, diarrhea and cough. Lovenox, Protonix, prn Tylenol, Benadryl and Zofran, GI panel, PT/OT, up with assistance. PCP: Saritha Coburn MD   %  Patient Goals/Plan/Treatment Preferences: Met with Nazanin Crews. She resides at home with her . Nazanin Crews states she is extremely weak. She has been on the couch for the past two weeks dealing with this diarrhea. Nazanin Crews verifies her insurance and PCP. She is able to afford her medications and doesn't feel she will need DME. She will have transportation at discharge. Nazanin Crews states she plans to return home at discharge. She is open to [facility] if needed, monitoring for therapy evaluations.

## 2022-02-01 NOTE — PROGRESS NOTES
Pt admitted to  5K7 via via cart/stretcher from ED. Complaints: Fatigue and diarrhea    IV site free of s/s of infection or infiltration. Vital signs obtained. Assessment and data collection initiated. Two nurse skin assessment performed by Ramsey Diaz RN and Elizabeth Abraham RN. Oriented to room. Policies and procedures for  explained. All questions answered with no further questions at this time. Fall prevention and safety brochure discussed with patient. Bed alarm on. Call light in reach. Oriented to room. Perri Wallace RN, RN 2/1/2022 4:46 AM     Explained patients right to have family, representative or physician notified of their admission. Patient has Declined for physician to be notified. Patient has Declined for family/representative to be notified.

## 2022-02-01 NOTE — ED NOTES
Pt and vs reassessed. RR even and unlabored. Pt resting in bed alert. hospitalist at bedside assessing pt. Pt medicated per [facility] and denies any other needs.  Pt stable at this time     [facility]  02/01/22 5813

## 2022-02-01 NOTE — ED NOTES
Pt and vs reassessed. RR even and unlabored. Pt resting in bed alert. EKG complete. Pt states she is unable to provide urine sample at this time. No distress noted. Pt given warm blanket and denies any other needs.  Pt stable at this time     Gris Baires, [address] [place] [address]  01/31/22 8819

## 2022-02-01 NOTE — ED NOTES
Pt and vs reassessed. RR even and unlabored. Pt resting in bed alert. This RN informed pt that Mark Bae would like her to ambulate to check her pulse ox. Pt states she usually walks at home without a walker but has not been walking around for a couple days due to fatigue. Pt states she does not feel strong enough to walk today. This RN informed Mark Bae. Pt denies any needs. No distress noted.  Pt stable at this time     [facility], [address] [place] [address]  01/31/22 2011

## 2022-02-01 NOTE — ED NOTES
Pt and vs reassessed. RR even and unlabored. Pt resting in bed alert. Pt given warm blanket and requesting benadryl due to allergies. Pt denies any other needs. No distress noted.  Pt stable at this time     Lexy Saenz, [address] [facility]  01/31/22 3137

## 2022-02-01 NOTE — PROGRESS NOTES
Patient's daughter Elidia Espinoza called for an update on patient. Daughter unable to provide HIPPA code. Informed daughter that I am unable to provide any information to her at this time until patient is spoken with and permission to release information is obtained.

## 2022-02-02 ENCOUNTER — APPOINTMENT (OUTPATIENT)
Dept: GENERAL RADIOLOGY | Age: 72
DRG: 310 | End: 2022-02-02
Payer: MEDICARE

## 2022-02-02 PROBLEM — F34.1 DYSTHYMIA: Status: ACTIVE | Noted: 2022-02-02

## 2022-02-02 LAB
ANION GAP SERPL CALCULATED.3IONS-SCNC: 10 MEQ/L (ref 8–16)
ANION GAP SERPL CALCULATED.3IONS-SCNC: 10 MEQ/L (ref 8–16)
BUN BLDV-MCNC: 11 MG/DL (ref 7–22)
BUN BLDV-MCNC: 11 MG/DL (ref 7–22)
CALCIUM IONIZED: 1.14 MMOL/L (ref 1.12–1.32)
CALCIUM SERPL-MCNC: 8.3 MG/DL (ref 8.5–10.5)
CALCIUM SERPL-MCNC: 8.4 MG/DL (ref 8.5–10.5)
CHLORIDE BLD-SCNC: 107 MEQ/L (ref 98–111)
CHLORIDE BLD-SCNC: 109 MEQ/L (ref 98–111)
CO2: 21 MEQ/L (ref 23–33)
CO2: 21 MEQ/L (ref 23–33)
CREAT SERPL-MCNC: 0.8 MG/DL (ref 0.4–1.2)
CREAT SERPL-MCNC: 0.8 MG/DL (ref 0.4–1.2)
EKG Q-T INTERVAL: 282 MS
EKG QRS DURATION: 64 MS
EKG QTC CALCULATION (BAZETT): 424 MS
EKG R AXIS: -45 DEGREES
EKG T AXIS: 11 DEGREES
EKG VENTRICULAR RATE: 136 BPM
ERYTHROCYTE [DISTWIDTH] IN BLOOD BY AUTOMATED COUNT: 13.1 % (ref 11.5–14.5)
ERYTHROCYTE [DISTWIDTH] IN BLOOD BY AUTOMATED COUNT: 13.2 % (ref 11.5–14.5)
ERYTHROCYTE [DISTWIDTH] IN BLOOD BY AUTOMATED COUNT: 42.7 FL (ref 35–45)
ERYTHROCYTE [DISTWIDTH] IN BLOOD BY AUTOMATED COUNT: 42.9 FL (ref 35–45)
GFR SERPL CREATININE-BSD FRML MDRD: 71 ML/MIN/1.73M2
GFR SERPL CREATININE-BSD FRML MDRD: 71 ML/MIN/1.73M2
GLUCOSE BLD-MCNC: 132 MG/DL (ref 70–108)
GLUCOSE BLD-MCNC: 136 MG/DL (ref 70–108)
HCT VFR BLD CALC: 37.5 % (ref 37–47)
HCT VFR BLD CALC: 38.7 % (ref 37–47)
HEMOGLOBIN: 11.8 GM/DL (ref 12–16)
HEMOGLOBIN: 12.5 GM/DL (ref 12–16)
LV EF: 55 %
LVEF MODALITY: NORMAL
MAGNESIUM: 2 MG/DL (ref 1.6–2.4)
MAGNESIUM: 2 MG/DL (ref 1.6–2.4)
MCH RBC QN AUTO: 28 PG (ref 26–33)
MCH RBC QN AUTO: 28.7 PG (ref 26–33)
MCHC RBC AUTO-ENTMCNC: 31.5 GM/DL (ref 32.2–35.5)
MCHC RBC AUTO-ENTMCNC: 32.3 GM/DL (ref 32.2–35.5)
MCV RBC AUTO: 88.9 FL (ref 81–99)
MCV RBC AUTO: 89 FL (ref 81–99)
PHOSPHORUS: 2.1 MG/DL (ref 2.4–4.7)
PHOSPHORUS: 2.5 MG/DL (ref 2.4–4.7)
PLATELET # BLD: 215 THOU/MM3 (ref 130–400)
PLATELET # BLD: 216 THOU/MM3 (ref 130–400)
PMV BLD AUTO: 10.3 FL (ref 9.4–12.4)
PMV BLD AUTO: 10.4 FL (ref 9.4–12.4)
POTASSIUM SERPL-SCNC: 3.6 MEQ/L (ref 3.5–5.2)
POTASSIUM SERPL-SCNC: 3.7 MEQ/L (ref 3.5–5.2)
RBC # BLD: 4.22 MILL/MM3 (ref 4.2–5.4)
RBC # BLD: 4.35 MILL/MM3 (ref 4.2–5.4)
SODIUM BLD-SCNC: 138 MEQ/L (ref 135–145)
SODIUM BLD-SCNC: 140 MEQ/L (ref 135–145)
TSH SERPL DL<=0.05 MIU/L-ACNC: 0.75 UIU/ML (ref 0.4–4.2)
WBC # BLD: 6 THOU/MM3 (ref 4.8–10.8)
WBC # BLD: 6.7 THOU/MM3 (ref 4.8–10.8)

## 2022-02-02 PROCEDURE — 2500000003 HC RX 250 WO HCPCS: Performed by: STUDENT IN AN ORGANIZED HEALTH CARE EDUCATION/TRAINING PROGRAM

## 2022-02-02 PROCEDURE — 96372 THER/PROPH/DIAG INJ SC/IM: CPT

## 2022-02-02 PROCEDURE — 97535 SELF CARE MNGMENT TRAINING: CPT

## 2022-02-02 PROCEDURE — 96365 THER/PROPH/DIAG IV INF INIT: CPT

## 2022-02-02 PROCEDURE — 97162 PT EVAL MOD COMPLEX 30 MIN: CPT

## 2022-02-02 PROCEDURE — 99232 SBSQ HOSP IP/OBS MODERATE 35: CPT | Performed by: FAMILY MEDICINE

## 2022-02-02 PROCEDURE — 97116 GAIT TRAINING THERAPY: CPT

## 2022-02-02 PROCEDURE — 1200000003 HC TELEMETRY R&B

## 2022-02-02 PROCEDURE — 2580000003 HC RX 258: Performed by: FAMILY MEDICINE

## 2022-02-02 PROCEDURE — 82330 ASSAY OF CALCIUM: CPT

## 2022-02-02 PROCEDURE — 6370000000 HC RX 637 (ALT 250 FOR IP): Performed by: INTERNAL MEDICINE

## 2022-02-02 PROCEDURE — 85027 COMPLETE CBC AUTOMATED: CPT

## 2022-02-02 PROCEDURE — 6370000000 HC RX 637 (ALT 250 FOR IP): Performed by: HOSPITALIST

## 2022-02-02 PROCEDURE — 6360000002 HC RX W HCPCS: Performed by: FAMILY MEDICINE

## 2022-02-02 PROCEDURE — 96366 THER/PROPH/DIAG IV INF ADDON: CPT

## 2022-02-02 PROCEDURE — 36415 COLL VENOUS BLD VENIPUNCTURE: CPT

## 2022-02-02 PROCEDURE — 93005 ELECTROCARDIOGRAM TRACING: CPT | Performed by: FAMILY MEDICINE

## 2022-02-02 PROCEDURE — 71045 X-RAY EXAM CHEST 1 VIEW: CPT

## 2022-02-02 PROCEDURE — 93306 TTE W/DOPPLER COMPLETE: CPT

## 2022-02-02 PROCEDURE — 2580000003 HC RX 258: Performed by: HOSPITALIST

## 2022-02-02 PROCEDURE — 97166 OT EVAL MOD COMPLEX 45 MIN: CPT

## 2022-02-02 PROCEDURE — 84443 ASSAY THYROID STIM HORMONE: CPT

## 2022-02-02 PROCEDURE — 80048 BASIC METABOLIC PNL TOTAL CA: CPT

## 2022-02-02 PROCEDURE — 83735 ASSAY OF MAGNESIUM: CPT

## 2022-02-02 PROCEDURE — 6370000000 HC RX 637 (ALT 250 FOR IP): Performed by: STUDENT IN AN ORGANIZED HEALTH CARE EDUCATION/TRAINING PROGRAM

## 2022-02-02 PROCEDURE — 84100 ASSAY OF PHOSPHORUS: CPT

## 2022-02-02 PROCEDURE — 96361 HYDRATE IV INFUSION ADD-ON: CPT

## 2022-02-02 PROCEDURE — 6360000002 HC RX W HCPCS: Performed by: HOSPITALIST

## 2022-02-02 PROCEDURE — 97530 THERAPEUTIC ACTIVITIES: CPT

## 2022-02-02 RX ORDER — METOPROLOL TARTRATE 5 MG/5ML
2.5 INJECTION INTRAVENOUS ONCE
Status: COMPLETED | OUTPATIENT
Start: 2022-02-02 | End: 2022-02-02

## 2022-02-02 RX ORDER — POTASSIUM CHLORIDE 7.45 MG/ML
10 INJECTION INTRAVENOUS
Status: COMPLETED | OUTPATIENT
Start: 2022-02-02 | End: 2022-02-02

## 2022-02-02 RX ORDER — LOPERAMIDE HYDROCHLORIDE 2 MG/1
2 CAPSULE ORAL 4 TIMES DAILY PRN
Status: DISCONTINUED | OUTPATIENT
Start: 2022-02-02 | End: 2022-02-04 | Stop reason: HOSPADM

## 2022-02-02 RX ADMIN — DEXTROSE AND SODIUM CHLORIDE: 5; 900 INJECTION, SOLUTION INTRAVENOUS at 10:03

## 2022-02-02 RX ADMIN — LOPERAMIDE HYDROCHLORIDE 2 MG: 2 CAPSULE ORAL at 18:14

## 2022-02-02 RX ADMIN — METOPROLOL TARTRATE 2.5 MG: 5 INJECTION INTRAVENOUS at 05:53

## 2022-02-02 RX ADMIN — METOPROLOL TARTRATE 12.5 MG: 25 TABLET, FILM COATED ORAL at 09:57

## 2022-02-02 RX ADMIN — POTASSIUM CHLORIDE 10 MEQ: 7.46 INJECTION, SOLUTION INTRAVENOUS at 11:15

## 2022-02-02 RX ADMIN — LOPERAMIDE HYDROCHLORIDE 2 MG: 2 CAPSULE ORAL at 09:57

## 2022-02-02 RX ADMIN — ONDANSETRON 4 MG: 2 INJECTION INTRAMUSCULAR; INTRAVENOUS at 20:26

## 2022-02-02 RX ADMIN — DIPHENHYDRAMINE HYDROCHLORIDE 50 MG: 25 TABLET ORAL at 13:13

## 2022-02-02 RX ADMIN — ENOXAPARIN SODIUM 40 MG: 100 INJECTION SUBCUTANEOUS at 09:57

## 2022-02-02 RX ADMIN — POTASSIUM CHLORIDE 10 MEQ: 7.46 INJECTION, SOLUTION INTRAVENOUS at 06:59

## 2022-02-02 RX ADMIN — PANTOPRAZOLE SODIUM 40 MG: 40 TABLET, DELAYED RELEASE ORAL at 07:52

## 2022-02-02 RX ADMIN — DEXTROSE AND SODIUM CHLORIDE: 5; 900 INJECTION, SOLUTION INTRAVENOUS at 19:31

## 2022-02-02 RX ADMIN — POTASSIUM CHLORIDE 10 MEQ: 7.46 INJECTION, SOLUTION INTRAVENOUS at 10:06

## 2022-02-02 RX ADMIN — DIPHENHYDRAMINE HYDROCHLORIDE 25 MG: 25 TABLET ORAL at 04:41

## 2022-02-02 RX ADMIN — METOPROLOL TARTRATE 12.5 MG: 25 TABLET, FILM COATED ORAL at 20:52

## 2022-02-02 RX ADMIN — SODIUM CHLORIDE, PRESERVATIVE FREE 10 ML: 5 INJECTION INTRAVENOUS at 09:57

## 2022-02-02 RX ADMIN — SODIUM CHLORIDE, PRESERVATIVE FREE 10 ML: 5 INJECTION INTRAVENOUS at 20:53

## 2022-02-02 RX ADMIN — POTASSIUM CHLORIDE 10 MEQ: 7.46 INJECTION, SOLUTION INTRAVENOUS at 12:39

## 2022-02-02 ASSESSMENT — ENCOUNTER SYMPTOMS
ABDOMINAL PAIN: 0
SHORTNESS OF BREATH: 0
CONSTIPATION: 0
BACK PAIN: 0
WHEEZING: 0
VOMITING: 0
ABDOMINAL DISTENTION: 0
CHOKING: 0
NAUSEA: 0
DIARRHEA: 0
COUGH: 0

## 2022-02-02 ASSESSMENT — PAIN SCALES - GENERAL
PAINLEVEL_OUTOF10: 0
PAINLEVEL_OUTOF10: 0

## 2022-02-02 NOTE — PROGRESS NOTES
Kelsey Vasquez 60  INPATIENT OCCUPATIONAL THERAPY  [facility] ONC MED 5K  EVALUATION    Time:    Time In: 4193  Time Out: 1235  Timed Code Treatment Minutes: 11 Minutes  Minutes: 26          Date: 2022  Patient Name: Saeed Yun,   Gender: female      MRN: [id number]  : 1950  (67 y.o.)  Referring Practitioner: Dr. Tray Moore. Taj  Diagnosis: Dehydration  Additional Pertinent Hx: Per ER note on 2022:72 y.o. female who presents for progressive weakness in the setting of COVID. Patient has had COVID 19 for 2 weeks. Symptoms include cough, fevers, decrease in appetite, decrease in oral intake with subsequent decrease in urination, nausea, vomiting, diarrhea, postural lightheadedness, and progressive weakness. Patient reports she feels like she has \"[place]mouth. \"  Patient has tried a brat diet and Zofran called in by her physician, but she is not taking in very much and what she does get in \"comes right out. \"  At 4:30 PM patient had a telehealth visit with her PCP who advised she come to the ER due to dehydration and concern for kidney injury. Rapid COVID test negative.     Restrictions/Precautions:  Restrictions/Precautions: General Precautions,Fall Risk  Position Activity Restriction  Other position/activity restrictions: 2L O2 on  (no O2 PLOF at home)    Subjective  Chart Reviewed: Yes,Orders,Progress Notes,History and Physical  Patient assessed for rehabilitation services?: Yes  Family / Caregiver Present: No    Subjective: coopreative, pleasant    Pain:  Pain Assessment  Patient Currently in Pain: Yes (L am with IV)    Vitals: Oxygen: 94-96%-requested to take off O2 during mobility to/from bathroom-maintained in 90s without O2; min SOB noted with activity    Social/Functional History:  Lives With: Spouse (grandson (23yo))  Type of Home: House  Home Layout: One level  Home Access: Stairs to enter without rails  Entrance Stairs - Number of Steps: 4  Entrance Stairs - Rails: None   Bathroom Shower/Tub: Tub only (Jacuzzi tub)  Bathroom Toilet: Standard    Receives Help From: Family  ADL Assistance: Independent  Homemaking Assistance: Independent  Ambulation Assistance: Independent  Transfer Assistance: Independent    Active : Yes     Additional Comments: Per PT note: Pt reports increased weakness since getting COVID 16 days ago. Pt's daughter assists her, however she works in [employer] and is in [employer] on the weekends. Pt's  shows signs of PD, and is likley not able to assist the pt. Pt denies any falls, and was previously indep with no AD PTA.    VISION:Corrected    HEARING:  WFL    COGNITION: Impulsive    RANGE OF MOTION:  Bilateral Upper Extremity:  WFL    STRENGTH:  Bilateral Upper Extremity:  4-/5 grossly    SENSATION:   WFL    ADL:   Grooming: Stand By Assistance. stood at sink to wash hands  Lower Extremity Dressing: Stand By Assistance. slip on shoes-reports some difficulty with socks at home (It is difficult for her to cross her LE up)  Toileting: Moderate Assistance. A for pulling pants up/down, applying cream to bottom  Toilet Transfer: Contact Guard Assistance. STS. BALANCE:  Standing Balance: Stand By Assistance. static, CGA dynamic    BED MOBILITY:  Not Tested    TRANSFERS:  Sit to Stand:  Contact Guard Assistance. Stand to Sit: Contact Guard Assistance. FUNCTIONAL MOBILITY:  Assistive Device: Rolling Walker  Assist Level:  Contact Guard Assistance. Distance: To and from bathroom  [address] for walker safety     Activity Tolerance:  Patient tolerance of  treatment: fair. Assessment:  Assessment: Pt demo decreased balance, safety awareness, & endurance for ADLs & functional mobility at [facility]. Continued OT recommended to faciliate improvements in these performance areas &  educate Pt on safety & adaptive strategies for returning home with .   Performance deficits / Impairments: Decreased functional mobility ,Decreased endurance,Decreased ADL status,Decreased balance,Decreased safe awareness,Decreased strength  Prognosis: Fair,Good  REQUIRES OT FOLLOW UP: Yes  Decision Making: Low Complexity  Safety Devices in place: Yes  Type of devices: All fall risk precautions in place,Call light within reach,Gait belt    Treatment Initiated: Treatment and education initiated within context of evaluation. Evaluation time included review of current medical information, gathering information related to past medical, social and functional history, completion of standardized testing, formal and informal observation of tasks, assessment of data and development of plan of care and goals. Treatment time included skilled education and facilitation of tasks to increase safety and independence with ADL's for improved functional independence and quality of life. Discharge Recommendations:  Continue to assess pending progress    Patient Education:  OT Education: OT Role,Plan of [address] [place] [address] Strategies,Transfer Training  Barriers to Learning: none    Equipment Recommendations: Other: Monitor pending progress-may benefit from otto & MARILYN bahena    Plan:  Times per week: 5x  Current Treatment Recommendations: Functional Mobility Training,Endurance Training,Safety Education & Training,Self-Care / ADL,Balance Training,Strengthening,Equipment Evaluation, Education, & procurement. See long-term goal time frame for expected duration of plan of care. If no long-term goals established, a short length of stay is anticipated.     Goals:  Patient goals : get feeling better  Short term goals  Time Frame for Short term goals: Until discharge  Short term goal 1: Pt will complete various t/fs including toilet with S & 0-1 vcs for safety  Short term goal 2: Pt will tolerate standing 3-4 min with S for increased ease of sinkside grooming  Short term goal 3: Pt will complete LE dressing with S & AE prn (would like sockaide ed)  Short term goal 4: Pt will complete mobility to/from bathroom with RW, S, & 0-2 vcs for walker safety  Short term goal 5: Pt will demo indep with BUE moderate resistance HEP for increased UB endurance for eventual returning to IADL tasks  Long term goals  Time Frame for Long term goals : No LTG set d/t short ELOS         Following session, patient left in safe position with all fall risk precautions in place.

## 2022-02-02 NOTE — PROGRESS NOTES
[facility]  INPATIENT PHYSICAL THERAPY  EVALUATION  [facility] ONC MED 5K - 5K-07/007-A    Time In: 7681  Time Out: 0945  Timed Code Treatment Minutes: 24 Minutes  Minutes: 38          Date: 2022  Patient Name: Gold Machuca,  Gender:  female        MRN: [id number]  : 1950  (67 y.o.)      Referring Practitioner: Deya Elder MD  Diagnosis: Dehydration  Additional Pertinent Hx: Per H&P : This is a 28-year-old female with past medical history significant for GERD, IBS, TMJ, gastritis, aortic regurgitation presenting with generalized weakness for 2 weeks. Patient was too weak to get around at home and decided to come to the emergency room for evaluation. History was provided by patient and family members who were at bedside. Patient was having nonspecific symptoms including mild cough, diarrhea and vomiting about 2 weeks ago. Patient symptoms continued and patient had tested positive at home with COVID-19 about a week ago. Patient's diarrhea did not stop and patient was getting weaker and weaker. Patient then decided to come to emergency room for evaluation. Patient's Covid test was negative in the emergency room. Patient went to bathroom and had bowel movements for at least 3 times during ER stay. Patient was given 1 L of fluid in the emergency room. Blood work was overall unremarkable. Patient and patient's family member felt uncomfortable going back home at this time. Restrictions/Precautions:  Restrictions/Precautions: General Precautions,Fall Risk  Position Activity Restriction  Other position/activity restrictions: . Subjective:  Chart Reviewed: Yes  Patient assessed for rehabilitation services?: Yes  Family / Caregiver Present: Yes (daughter)  Subjective: RN approved session. Pt pleasantly agrees for PT isabellaal, her daughter is present. Pt states she wa spreviously indep with no AD, but has gotten very weak from her recent COVID and has been requiring assistance.  Pt states she plans to d/c back home with spouse. Pt receptive to continued PT, would benefit from continued education regarding the use of RW. General:  Overall Orientation Status: Within Functional Limits (not formally assessed)  Follows Commands: Within Functional Limits    Vision: Impaired  Vision Exceptions: Wears glasses at all times    Hearing: Within functional limits     Pain: unrated: in stomach and abdomen     Vitals: Oxygen: 95% baseline, maintained WFL  Heart Rate: 85 bpm basleine, increased to 90's with mobility   *Pt on 2 L O2 via NC     Social/Functional History:    Lives With: Spouse (grandson (23yo))  Type of Home: House  Home Layout: One level  Home Access: Stairs to enter without rails  Entrance Stairs - Number of Steps: 4  Entrance Stairs - Rails: None     Bathroom Shower/Tub: Tub only (Jacuzzi tub)  Bathroom Toilet: Standard    Receives Help From: Family  ADL Assistance: Independent  Homemaking Assistance: Independent  Ambulation Assistance: Independent  Transfer Assistance: Independent    Active : Yes     Additional Comments: Per PT note: Pt reports increased weakness since getting COVID 16 days ago. Pt's daughter assists her, however she works in [employer] and is in [employer] on the weekends. Pt's  shows signs of PD, and is likley not able to assist the pt. Pt denies any falls, and was previously indep with no AD PTA. OBJECTIVE:  Range of Motion:  Bilateral Lower Extremity: WFL    Strength:  Bilateral Lower Extremity: proximal muscle weakness noted 4/5 at B hip flexion, grossly 4+/5 at knee and ankle joints     Balance:  Dynamic Sitting Balance: Stand By Assistance  Static Standing Balance: Contact Guard Assistance  Dynamic Standing Balance: Minimal Assistance   *Min assist provided to steady during the Tinetti  *Pt reports \"flashes\" in her vision during Tinetti, RN notified, states this is likely from her electrolytes and heart symptoms.      Bed Mobility:  Supine to Sit: Contact Guard Assistance, with head of bed raised, with rail, with increased time for completion   *HOB~35 degrees    Transfers:  Sit to Stand: [employer], with increased time for completion  Stand to [place] [id number], with increased time for completion  *Completed ~4 trials from bed and chair. Steadying assist provided. Ambulation:  Contact Guard Assistance, Minimal Assistance, with verbal cues , with increased time for completion  Distance: ~15'  Surface: Level Tile  Device:No Device  Gait Deviations:  Slow Lynnette, Decreased Step Length Bilaterally, Decreased Heel Strike Bilaterally, Wide Base of Support, Mild Path Deviations, Unsteady Gait and Decreased Terminal Knee Extension  *Pt noted to ambulate with increased trunk sway, reaching for items in her environment and with general unsteadiness. CGA to min assist provided for safety and stability. *Pt educated on PT recommendation to ambulate with RW for a short time to improve safety and decrease risk of falls. Pt not receptive to this and states she has plenty of furniture to use at home. Education provided on safety concerns with furniture walking. Pt would benefit from continued eduction regarding this. Exercise:  None completed    Functional Outcome Measures: Completed  Balance Score: 8  Gait Score: 8  Tinetti Total Score: 16/28 with high fall risk   AM-PAC Inpatient Mobility Raw Score : 17  AM-PAC Inpatient T-Scale Score : 42.13    ASSESSMENT:  Activity Tolerance:  Patient tolerance of  treatment: good. Pt noted to have increased fatigue following mobility requiring ~3 rest breaks to recover during session. Pt required CGA and min assist intermittently with ambulation and tranfers for stability, with cues for safety. PT would benefit from use of a RW if she is receptive to one. Treatment Initiated: Treatment and education initiated within context of evaluation.   Evaluation time included review of current medical information, gathering information related to past medical, social and functional history, completion of standardized testing, formal and informal observation of tasks, assessment of data and development of plan of care and goals. Treatment time included skilled education and facilitation of tasks to increase safety and independence with functional mobility for improved independence and quality of life. Assessment: Body structures, Functions, Activity limitations: Decreased functional mobility ,Decreased balance,Decreased strength,Decreased safe awareness,Decreased endurance  Assessment: This patient is a 67 y.o. who presents with dehydration. This is a decline from the patient's baseline status of indep with no AD and living at home with her spouse. PT scored a 16/28 on the Tinetti indicating a high fall risk. The patient is observed to have deficits in strength, balance, activity tolerance, and safety awareness and would benefit from skilled PT services to progress functional mobility, safety awareness, and to decrease overall risk of falls. Pt would benefit from continued PT, use of RW if receptive to one, and increased level of assistance.      Prognosis: Good    REQUIRES PT FOLLOW UP: Yes    Discharge Recommendations:  Discharge Recommendations: 24 hour supervision or assist,Continue to assess pending progress,Patient would benefit from continued therapy after discharge    Patient Education:  PT Education: Ritika Earing of Care,Transfer Training,General Safety,Gait Training,Functional Mobility Training    Equipment Recommendations:  Equipment Needed: Yes (PT would benefit from a RW if she is willing to use one)    Plan:  Times per week: 5x GM  Current Treatment Recommendations: Strengthening,Neuromuscular Re-education,Home Exercise Program,Safety Education & Training,Balance Training,Endurance Training,Patient/Caregiver Education & Training,Functional Mobility Training,Equipment Evaluation, Education, & procurement,Transfer Training,Gait Training,Stair training    Goals:  Patient goals : To get better to and to go on Vacation to [place] in a few months  Short term goals  Time Frame for Short term goals: By hospital d/c  Short term goal 1: Pt to demo supine <->sit mod I for ability to get in and out of bed with ease  Short term goal 2: Pt to demo sit <->stand witth LRAD mod I for ability to safely get up from any surface in her environment  Short term goal 3: Pt to ambulate >=50' with LRAD with S with no losses in stability to progress maneuvering within her environment  Short term goal 4: Pt to ascend/descend 4 steps no rail with SBA for ability to safely enter her home  Short term goal 5: Pt to progress Tinetti score to >=19/28 to improve to the moderate fall risk category  Long term goals  Time Frame for Long term goals : NA due to short ELOS    Following session, patient left in safe position with all fall risk precautions in place.

## 2022-02-02 NOTE — SIGNIFICANT EVENT
Called to patient's bedside for A. fib RVR with new oxygen requirement. Upon arrival patient was resting comfortably in bed with no palpitations or feelings of shortness of breath. Patient previously on room air however saturation went to 84 currently patient is on 3 L nasal cannula with SPO2 94%. Patient's blood pressure 131/91 with heart rate of 130. Per patient's nurse she did a very similar thing yesterday in which she converted to normal sinus rhythm with 12.5 p.o. Lopressor. Lopressor 2.5 IV was ordered and will monitor response, if this is not sufficient will give another dose or attempt with other medication. Of note patient did have significant urgency to go to the bathroom which can also cause elevated heart rate. Chest x-ray, BMP, TSH ordered     [id number] Score: 2  Patient recently lost daughter to post COVID-19 pulmonary emboli. Will let primary service discuss anticoagulation therapy as her patient is currently on Lovenox for DVT prophylaxis only.     Electronically signed by Terra Petersen DO on 2/2/2022 at 5:45 AM

## 2022-02-02 NOTE — PROGRESS NOTES
Listened to heart and is no longer tachycardic. Checked pulse and is now down to 89. Patients daughter is in room with her. Call light within reach.

## 2022-02-02 NOTE — PROGRESS NOTES
PROGRESS NOTE      Patient:  Kale Pena      Unit/Bed:5K-07/007-A    YOB: 1950    MRN: [id number]       Acct: [de-identified]     PCP: Deny Ribera MD    Date of Admission: 1/31/2022      Assessment/Plan:    Anticipated Discharge in : Pending. Active Hospital Problems    Diagnosis Date Noted    Dysthymia [F34.1] 02/02/2022    Generalized weakness [R53.1] 02/01/2022    Morbidly obese ([address] [E66.01] 09/21/2020    IBS (irritable bowel syndrome) [K58.9]     GERD (gastroesophageal reflux disease) [K21.9]        New on Set Afib with RVR - Feb/02/2022, noted to have new detected Afib with RVR with SOB. Converted with Lopressor. Currently NSR. Echocardiogram ordered pending read, [id number] Score: 2 Currently only on Lovenox at this time. Continue telemetry. Continue lopressor. Generalized Weakness - unspecified etiology, likely 2/2 interim electrolyte changes in setting of Diarrhea below. Improving. Electrolytes stable, Continue PT/OT  Continue currently fluids. Diarrhea - Hx of, covid-19 recent infection, improving, control with imodium symptomatically. Stool studies negative. Monitor electrolytes, replete as necessary, continue current fluids as decreased PO intake. ,  Complicate by IBS hx below. Hx of IBS - Hx of monitor, see Diarrhea above. Hx of GERD - Continue PPI. Recent Covid-19 - Hx of, recent test negative. CXR remarkable for patchy infiltrates. Chief Complaint: Generalized Weakness, Diarrhea. Hospital Course:    Per initial H&P:  \"This is a 70-year-old female with past medical history significant for GERD, IBS, TMJ, gastritis, aortic regurgitation presenting with generalized weakness for 2 weeks. Patient was too weak to get around at home and decided to come to the emergency room for evaluation. History was provided by patient and family members who were at bedside.   Patient was having nonspecific symptoms including mild cough, diarrhea and vomiting about 2 weeks ago. Patient symptoms continued and patient had tested positive at home with COVID-19 about a week ago. Patient's diarrhea did not stop and patient was getting weaker and weaker. Patient then decided to come to emergency room for evaluation. Patient's Covid test was negative in the emergency room. Patient went to bathroom and had bowel movements for at least 3 times during ER stay. Patient was given 1 L of fluid in the emergency room. Blood work was overall unremarkable. Patient and patient's family member felt uncomfortable going back home at this time. \"    Subjective:      Pt seen and examined. Overnight, note new detected Afib with RVR, converted with with admin of lopressor. Currently NSR. No gross compliants, mild cough which may be related to covid-19 sequelae. Remains some weakness, but 5/5 all extremities when laying. Will continue to work with PT OT, continue to replete fluids and electrolytes as needed. .    Review of Systems   Constitutional: Positive for activity change and appetite change. Negative for chills, diaphoresis, fatigue and fever. Respiratory: Negative for cough, choking, shortness of breath and wheezing. Cardiovascular: Negative for chest pain, palpitations and leg swelling. Gastrointestinal: Negative for abdominal distention, abdominal pain, constipation, diarrhea, nausea and vomiting. Genitourinary: Negative for dyspareunia, dysuria, frequency, hematuria, pelvic pain and urgency. Musculoskeletal: Positive for gait problem. Negative for arthralgias, back pain, joint swelling, myalgias, neck pain and neck stiffness. Skin: Negative for pallor, rash and wound. Neurological: Negative for dizziness, weakness, light-headedness and headaches. Psychiatric/Behavioral: Negative for behavioral problems, confusion, sleep disturbance and suicidal ideas. The patient is not nervous/anxious.         Medications:  Reviewed    Infusion Medications    sodium chloride  dextrose 5 % and 0.9 % NaCl 100 mL/hr at 02/02/22 1003     Scheduled Medications    phosphorus replacement protocol   Other RX Placeholder    sodium chloride flush  10 mL IntraVENous 2 times per day    enoxaparin  40 mg SubCUTAneous Daily    pantoprazole  40 mg Oral QAM AC    metoprolol tartrate  12.5 mg Oral BID     PRN Meds: loperamide, diphenhydrAMINE, sodium chloride flush, sodium chloride, ondansetron **OR** ondansetron, polyethylene glycol, acetaminophen **OR** acetaminophen      Intake/Output Summary (Last 24 hours) at 2/2/2022 1656  Last data filed at 2/1/2022 2223  Gross per 24 hour   Intake 150 ml   Output --   Net 150 ml       Diet:  ADULT DIET; Regular    Exam:  BP (!) 147/109   Pulse 86   Temp 98.2 °F (36.8 °C) (Oral)   Resp 18   Ht 4' 11\" (1.499 m)   Wt 180 lb (81.6 kg)   SpO2 95%   BMI 36.36 kg/m²     Physical Exam  Constitutional:       General: She is not in acute distress. Appearance: Normal appearance. She is obese. She is ill-appearing. She is not diaphoretic. HENT:      Head: Normocephalic and atraumatic. Nose: Nose normal. No congestion or rhinorrhea. Mouth/Throat:      Mouth: Mucous membranes are moist.      Pharynx: Oropharynx is clear. No oropharyngeal exudate or posterior oropharyngeal erythema. Eyes:      General: No scleral icterus. Right eye: No discharge. Left eye: No discharge. Extraocular Movements: Extraocular movements intact. Conjunctiva/sclera: Conjunctivae normal.      Pupils: Pupils are equal, round, and reactive to light. Cardiovascular:      Rate and Rhythm: Normal rate and regular rhythm. Pulses: Normal pulses. Heart sounds: Normal heart sounds. No murmur heard. No friction rub. No gallop. Pulmonary:      Effort: Pulmonary effort is normal. No respiratory distress. Breath sounds: Normal breath sounds. No wheezing, rhonchi or rales. Abdominal:      General: Abdomen is flat.  Bowel sounds are normal. There is no distension. Tenderness: There is no abdominal tenderness. There is no guarding. Musculoskeletal:         General: No swelling, tenderness or signs of injury. Normal range of motion. Right lower leg: No edema. Left lower leg: No edema. Skin:     General: Skin is warm and dry. Capillary Refill: Capillary refill takes 2 to 3 seconds. Coloration: Skin is not jaundiced or pale. Findings: No erythema or rash. Neurological:      General: No focal deficit present. Mental Status: She is alert and oriented to person, place, and time. Cranial Nerves: No cranial nerve deficit. Sensory: No sensory deficit. Motor: Weakness (5/5 all limbs however pt subjectively feels weak.) present. Labs:   Recent Labs     02/01/22 0455 02/02/22 0340 02/02/22  0526   WBC 7.4 6.0 6.7   HGB 12.5 11.8* 12.5   HCT 39.4 37.5 38.7    216 215     Recent Labs     02/01/22 0455 02/02/22 0340 02/02/22  0526    140 138   K 4.5 3.6 3.7    109 107   CO2 21* 21* 21*   BUN 17 11 11   CREATININE 0.9 0.8 0.8   CALCIUM 8.4* 8.3* 8.4*   PHOS  --  2.5 2.1*     Recent Labs     01/31/22 2032 02/01/22  0455   AST 41* 36   ALT 30 27   BILITOT 0.4 0.4   ALKPHOS 59 62     No results for input(s): INR in the last 72 hours. No results for input(s): Chyrel Lions in the last 72 hours. Urinalysis:      Lab Results   Component Value Date    NITRU NEGATIVE 01/31/2022    BLOODU NEGATIVE 01/31/2022    GLUCOSEU NEGATIVE 01/31/2022       Radiology:  XR CHEST PORTABLE   Final Result   Impression:   Moderate bilateral patchy infiltrates. This document has been electronically signed by: Jeancarlos Montejo MD on    02/02/2022 06:03 AM      XR CHEST PORTABLE   Final Result   1. Normal heart size. No effusion. 2. Relatively mild Multifocal patchy groundglass infiltrates scattered in both lungs, consistent with Covid pneumonia.             **This report has been created

## 2022-02-03 LAB
ANION GAP SERPL CALCULATED.3IONS-SCNC: 9 MEQ/L (ref 8–16)
BUN BLDV-MCNC: 6 MG/DL (ref 7–22)
CALCIUM IONIZED: 1.17 MMOL/L (ref 1.12–1.32)
CALCIUM SERPL-MCNC: 8.1 MG/DL (ref 8.5–10.5)
CHLORIDE BLD-SCNC: 110 MEQ/L (ref 98–111)
CO2: 21 MEQ/L (ref 23–33)
CREAT SERPL-MCNC: 0.9 MG/DL (ref 0.4–1.2)
ERYTHROCYTE [DISTWIDTH] IN BLOOD BY AUTOMATED COUNT: 13.1 % (ref 11.5–14.5)
ERYTHROCYTE [DISTWIDTH] IN BLOOD BY AUTOMATED COUNT: 42.5 FL (ref 35–45)
GFR SERPL CREATININE-BSD FRML MDRD: 62 ML/MIN/1.73M2
GLUCOSE BLD-MCNC: 137 MG/DL (ref 70–108)
HCT VFR BLD CALC: 34.9 % (ref 37–47)
HEMOGLOBIN: 11.2 GM/DL (ref 12–16)
MAGNESIUM: 1.9 MG/DL (ref 1.6–2.4)
MCH RBC QN AUTO: 28.4 PG (ref 26–33)
MCHC RBC AUTO-ENTMCNC: 32.1 GM/DL (ref 32.2–35.5)
MCV RBC AUTO: 88.4 FL (ref 81–99)
OVA AND PARASITES: NORMAL
PHOSPHORUS: 2.7 MG/DL (ref 2.4–4.7)
PLATELET # BLD: 224 THOU/MM3 (ref 130–400)
PMV BLD AUTO: 9.6 FL (ref 9.4–12.4)
POTASSIUM SERPL-SCNC: 4.3 MEQ/L (ref 3.5–5.2)
RBC # BLD: 3.95 MILL/MM3 (ref 4.2–5.4)
SODIUM BLD-SCNC: 140 MEQ/L (ref 135–145)
WBC # BLD: 5.9 THOU/MM3 (ref 4.8–10.8)

## 2022-02-03 PROCEDURE — 6370000000 HC RX 637 (ALT 250 FOR IP)

## 2022-02-03 PROCEDURE — 84100 ASSAY OF PHOSPHORUS: CPT

## 2022-02-03 PROCEDURE — 1200000003 HC TELEMETRY R&B

## 2022-02-03 PROCEDURE — 83735 ASSAY OF MAGNESIUM: CPT

## 2022-02-03 PROCEDURE — 2580000003 HC RX 258: Performed by: FAMILY MEDICINE

## 2022-02-03 PROCEDURE — 97535 SELF CARE MNGMENT TRAINING: CPT

## 2022-02-03 PROCEDURE — 85027 COMPLETE CBC AUTOMATED: CPT

## 2022-02-03 PROCEDURE — 6370000000 HC RX 637 (ALT 250 FOR IP): Performed by: INTERNAL MEDICINE

## 2022-02-03 PROCEDURE — 36415 COLL VENOUS BLD VENIPUNCTURE: CPT

## 2022-02-03 PROCEDURE — 97530 THERAPEUTIC ACTIVITIES: CPT

## 2022-02-03 PROCEDURE — 99232 SBSQ HOSP IP/OBS MODERATE 35: CPT | Performed by: FAMILY MEDICINE

## 2022-02-03 PROCEDURE — 96372 THER/PROPH/DIAG INJ SC/IM: CPT

## 2022-02-03 PROCEDURE — 80048 BASIC METABOLIC PNL TOTAL CA: CPT

## 2022-02-03 PROCEDURE — 82330 ASSAY OF CALCIUM: CPT

## 2022-02-03 PROCEDURE — 96361 HYDRATE IV INFUSION ADD-ON: CPT

## 2022-02-03 PROCEDURE — 6370000000 HC RX 637 (ALT 250 FOR IP): Performed by: HOSPITALIST

## 2022-02-03 PROCEDURE — 6360000002 HC RX W HCPCS: Performed by: HOSPITALIST

## 2022-02-03 RX ORDER — HYDROCODONE POLISTIREX AND CHLORPHENIRAMINE POLISTIREX 10; 8 MG/5ML; MG/5ML
5 SUSPENSION, EXTENDED RELEASE ORAL EVERY 12 HOURS PRN
Status: DISCONTINUED | OUTPATIENT
Start: 2022-02-03 | End: 2022-02-04 | Stop reason: HOSPADM

## 2022-02-03 RX ORDER — FAMOTIDINE 20 MG/1
20 TABLET, FILM COATED ORAL 2 TIMES DAILY
Status: DISCONTINUED | OUTPATIENT
Start: 2022-02-03 | End: 2022-02-04 | Stop reason: HOSPADM

## 2022-02-03 RX ADMIN — ENOXAPARIN SODIUM 40 MG: 100 INJECTION SUBCUTANEOUS at 08:21

## 2022-02-03 RX ADMIN — METOPROLOL TARTRATE 12.5 MG: 25 TABLET, FILM COATED ORAL at 08:21

## 2022-02-03 RX ADMIN — DEXTROSE AND SODIUM CHLORIDE: 5; 900 INJECTION, SOLUTION INTRAVENOUS at 15:55

## 2022-02-03 RX ADMIN — DEXTROSE AND SODIUM CHLORIDE: 5; 900 INJECTION, SOLUTION INTRAVENOUS at 06:35

## 2022-02-03 RX ADMIN — FAMOTIDINE 20 MG: 20 TABLET, FILM COATED ORAL at 21:51

## 2022-02-03 RX ADMIN — METOPROLOL TARTRATE 12.5 MG: 25 TABLET, FILM COATED ORAL at 21:51

## 2022-02-03 RX ADMIN — PANTOPRAZOLE SODIUM 40 MG: 40 TABLET, DELAYED RELEASE ORAL at 06:27

## 2022-02-03 RX ADMIN — FAMOTIDINE 20 MG: 20 TABLET, FILM COATED ORAL at 10:15

## 2022-02-03 RX ADMIN — Medication 5 ML: at 10:19

## 2022-02-03 ASSESSMENT — PAIN SCALES - GENERAL
PAINLEVEL_OUTOF10: 0

## 2022-02-03 ASSESSMENT — ENCOUNTER SYMPTOMS
VOMITING: 0
CHOKING: 0
DIARRHEA: 0
ABDOMINAL PAIN: 0
NAUSEA: 0
WHEEZING: 0
COUGH: 1
SHORTNESS OF BREATH: 0
BACK PAIN: 0
CONSTIPATION: 0
ABDOMINAL DISTENTION: 0

## 2022-02-03 NOTE — FLOWSHEET NOTE
[facility] 88 PROGRESS NOTE      Patient: Alyssa Bullock  Room #: 5K-07/007-A            YOB: 1950  Age: 67 y.o. Gender: female            Admit Date & Time: 1/31/2022  6:30 PM    Assessment:  Roxy Gallagher is a 67year old female who is sitting up in a recliner and had her daughter in the room with her. She was smiling and stated that she is feeling better than yesterday. Her daughter had driven to see her from [place] last night. Father Chin Shaw and this  ministered to her    Interventions: Active listening, hope building and offered prayer for healing as well as for her daughter's drive back to [place]. Outcomes:  encouraged    Plan:    1. Spiritual care staff are available to pt as needed.      Electronically signed by Jessi Shahid on 2/3/2022 at 10:57 AM.  [address] [facility]  [phone]

## 2022-02-03 NOTE — CARE COORDINATION
2/3/22, 9:11 AM EST    DISCHARGE ON GOING EVALUATION    [facility] day: 1  Location: [facility]07/007-A Reason for admit: Dehydration [E86.0]  Gait instability [R26.81]  General weakness [R53.1]  Generalized weakness [R53.1]  Pneumonia due to COVID-19 virus [U07.1, J12.82]  Dysthymia [F34.1]   Barriers to Discharge: Obs. To Inpt. Status. PT/OT, echocardiogram completed, IV fluids, Lovenox, prn medications, daily BMP, CBC, Ionized calcium, Magnesium, and Phosphorus, regular diet, telemetry, up with assistance. PCP: Herrera Ashton MD  Readmission Risk Score: 10.7 ( )%  Patient Goals/Plan/Treatment Preferences: Lola Feliciano is from home with her . She agrees to [facility] at discharge. List of agencies delivered to her.

## 2022-02-04 VITALS
HEART RATE: 83 BPM | SYSTOLIC BLOOD PRESSURE: 141 MMHG | HEIGHT: 59 IN | RESPIRATION RATE: 20 BRPM | TEMPERATURE: 97.8 F | WEIGHT: 180 LBS | OXYGEN SATURATION: 92 % | DIASTOLIC BLOOD PRESSURE: 71 MMHG | BODY MASS INDEX: 36.29 KG/M2

## 2022-02-04 LAB
ANION GAP SERPL CALCULATED.3IONS-SCNC: 10 MEQ/L (ref 8–16)
BUN BLDV-MCNC: 5 MG/DL (ref 7–22)
CALCIUM IONIZED: 1.13 MMOL/L (ref 1.12–1.32)
CALCIUM SERPL-MCNC: 8.1 MG/DL (ref 8.5–10.5)
CHLORIDE BLD-SCNC: 112 MEQ/L (ref 98–111)
CO2: 20 MEQ/L (ref 23–33)
CREAT SERPL-MCNC: 0.8 MG/DL (ref 0.4–1.2)
ERYTHROCYTE [DISTWIDTH] IN BLOOD BY AUTOMATED COUNT: 13 % (ref 11.5–14.5)
ERYTHROCYTE [DISTWIDTH] IN BLOOD BY AUTOMATED COUNT: 42.2 FL (ref 35–45)
GFR SERPL CREATININE-BSD FRML MDRD: 70 ML/MIN/1.73M2
GLUCOSE BLD-MCNC: 111 MG/DL (ref 70–108)
HCT VFR BLD CALC: 35.8 % (ref 37–47)
HEMOGLOBIN: 11.3 GM/DL (ref 12–16)
MAGNESIUM: 1.8 MG/DL (ref 1.6–2.4)
MCH RBC QN AUTO: 28 PG (ref 26–33)
MCHC RBC AUTO-ENTMCNC: 31.6 GM/DL (ref 32.2–35.5)
MCV RBC AUTO: 88.6 FL (ref 81–99)
PHOSPHORUS: 3.3 MG/DL (ref 2.4–4.7)
PLATELET # BLD: 251 THOU/MM3 (ref 130–400)
PMV BLD AUTO: 9.7 FL (ref 9.4–12.4)
POTASSIUM SERPL-SCNC: 4.1 MEQ/L (ref 3.5–5.2)
RBC # BLD: 4.04 MILL/MM3 (ref 4.2–5.4)
SODIUM BLD-SCNC: 142 MEQ/L (ref 135–145)
WBC # BLD: 5.3 THOU/MM3 (ref 4.8–10.8)

## 2022-02-04 PROCEDURE — 6370000000 HC RX 637 (ALT 250 FOR IP): Performed by: HOSPITALIST

## 2022-02-04 PROCEDURE — 85027 COMPLETE CBC AUTOMATED: CPT

## 2022-02-04 PROCEDURE — 2580000003 HC RX 258: Performed by: FAMILY MEDICINE

## 2022-02-04 PROCEDURE — 80048 BASIC METABOLIC PNL TOTAL CA: CPT

## 2022-02-04 PROCEDURE — 97530 THERAPEUTIC ACTIVITIES: CPT

## 2022-02-04 PROCEDURE — 6370000000 HC RX 637 (ALT 250 FOR IP): Performed by: INTERNAL MEDICINE

## 2022-02-04 PROCEDURE — 6370000000 HC RX 637 (ALT 250 FOR IP)

## 2022-02-04 PROCEDURE — 99239 HOSP IP/OBS DSCHRG MGMT >30: CPT | Performed by: FAMILY MEDICINE

## 2022-02-04 PROCEDURE — 96372 THER/PROPH/DIAG INJ SC/IM: CPT

## 2022-02-04 PROCEDURE — 82330 ASSAY OF CALCIUM: CPT

## 2022-02-04 PROCEDURE — 36415 COLL VENOUS BLD VENIPUNCTURE: CPT

## 2022-02-04 PROCEDURE — 96361 HYDRATE IV INFUSION ADD-ON: CPT

## 2022-02-04 PROCEDURE — 84100 ASSAY OF PHOSPHORUS: CPT

## 2022-02-04 PROCEDURE — 83735 ASSAY OF MAGNESIUM: CPT

## 2022-02-04 PROCEDURE — 93270 REMOTE 30 DAY ECG REV/REPORT: CPT

## 2022-02-04 PROCEDURE — 6360000002 HC RX W HCPCS: Performed by: HOSPITALIST

## 2022-02-04 RX ORDER — PANTOPRAZOLE SODIUM 40 MG/1
40 TABLET, DELAYED RELEASE ORAL
Qty: 30 TABLET | Refills: 0 | Status: SHIPPED | OUTPATIENT
Start: 2022-02-05 | End: 2022-03-06 | Stop reason: SDUPTHER

## 2022-02-04 RX ORDER — FAMOTIDINE 20 MG/1
20 TABLET, FILM COATED ORAL 2 TIMES DAILY
Qty: 60 TABLET | Refills: 0 | Status: SHIPPED | OUTPATIENT
Start: 2022-02-04 | End: 2022-03-06 | Stop reason: ALTCHOICE

## 2022-02-04 RX ORDER — LOPERAMIDE HYDROCHLORIDE 2 MG/1
2 CAPSULE ORAL 4 TIMES DAILY PRN
Qty: 40 CAPSULE | Refills: 0 | Status: SHIPPED | OUTPATIENT
Start: 2022-02-04 | End: 2022-02-14

## 2022-02-04 RX ADMIN — ONDANSETRON 4 MG: 4 TABLET, ORALLY DISINTEGRATING ORAL at 13:58

## 2022-02-04 RX ADMIN — METOPROLOL TARTRATE 12.5 MG: 25 TABLET, FILM COATED ORAL at 08:25

## 2022-02-04 RX ADMIN — FAMOTIDINE 20 MG: 20 TABLET, FILM COATED ORAL at 08:24

## 2022-02-04 RX ADMIN — DEXTROSE AND SODIUM CHLORIDE: 5; 900 INJECTION, SOLUTION INTRAVENOUS at 02:19

## 2022-02-04 RX ADMIN — PANTOPRAZOLE SODIUM 40 MG: 40 TABLET, DELAYED RELEASE ORAL at 06:48

## 2022-02-04 RX ADMIN — Medication 5 ML: at 02:24

## 2022-02-04 RX ADMIN — ENOXAPARIN SODIUM 40 MG: 100 INJECTION SUBCUTANEOUS at 08:25

## 2022-02-04 ASSESSMENT — PAIN SCALES - GENERAL
PAINLEVEL_OUTOF10: 0
PAINLEVEL_OUTOF10: 0

## 2022-02-04 NOTE — CARE COORDINATION
Gold Machuca was evaluated today and a DME order was entered for a wheeled walker because she requires this to successfully complete daily living tasks of eating, bathing, toileting, personal cares, ambulating, grooming, hygiene, dressing upper body, dressing lower body, meal preparation and taking own medications. A wheeled walker is necessary due to the patient's unsteady gait, upper body weakness, and inability to  an ambulation device; and she can ambulate only by pushing a walker instead of a lesser assistive device such as a cane, crutch, or standard walker. The need for this equipment was discussed with the patient and she understands and is in agreement.

## 2022-02-04 NOTE — PROGRESS NOTES
Kelsey Vasquez 60  PHYSICAL THERAPY MISSED TREATMENT NOTE  [facility] ONC MED 5K    Date: 2022  Patient Name: Darrin Graves        MRN: [id number]   : 1950  (67 y.o.)  Gender: female   Referring Practitioner: Xiao Carter MD  Diagnosis: Dehydration         REASON FOR MISSED TREATMENT:  Patient refused treatment. Pt is d/c soon and states she does not want to be too tired prior to going home. Pt declines practicing stairs, states her  or children will help her. Educated pt on adjusting RW height.      Augusto Josue PT, DPT

## 2022-02-04 NOTE — CARE COORDINATION
DISCHARGE/PLANNING EVALUATION  2/4/22, 11:57 AM EST    Reason for Referral: Patient agrees to [facility] at discharge. Mental Status: alert and oriented  Decision Making: patient is able to make her own decisions. Family/Social/Home Environment: Spoke with patient re: home situation Patient states that she and her spouse (questionable early Parkinson's but independent) reside in a 1 story home. Patient states that she is able to use a Jacuzzi tub for her personal care. She shared that she she has no problem with getting in and out and is independent with her personal care. She states that her and her spouse share cooking and cleaning. Both continue to drive. She reports that she is comfortable at home and that all needs at being met. She is receptive to home health follow up at discharge. Current Services including food security, transportation and housekeeping: none prior to admission. Current Equipment: none-patient will need a walker at discharge. RODNEY Ortiz has ordered this through [address] [facility]. Payment Source: Medicare with secondary  Concerns or Barriers to Discharge: none indicated  Post acute provider list with quality measures, geographic area and applicable managed care information provided. Questions regarding selection process answered: yes    Teach Back Method used with patient regarding care plan  Patient verbalize understanding of the plan of care and contribute to goal setting. Patient goals, treatment preferences and discharge plan: patient plans home at discharge with new home health referral. She has selected [facility]. She is aware that visits will not be as often as family is requesting. Call to Carine Chou and message left for [facility] with referral information and that patient is being discharged today. Requested call back. RO Shook updated. No other needs at this time. 2/4/22, 1:16 PM EST    Patient goals/plan/ treatment preferences discussed by  and .   Patient goals/plan/ treatment preferences reviewed with patient/ family. Patient/ family verbalize understanding of discharge plan and are in agreement with goal/plan/treatment preferences. Understanding was demonstrated using the teach back method. AVS provided by RN at time of discharge, which includes all necessary medical information pertaining to the patients current course of illness, treatment, post-discharge goals of care, and treatment preferences. Services After Discharge  Services At/After Discharge: PT,OT,Nursing Services ([facility])   IMM Letter  IMM Letter given to Patient/Family/Significant other/Guardian/POA/by[de-identified] Copy delivered to patient by mgr. Ortiz  IMM Letter date given[de-identified] 02/04/22  IMM Letter time given[de-identified] 18  Observation Status Letter date given[de-identified] 02/01/22  Observation Status Letter time given[de-identified] 0121  Observation Status Letter given to Patient/Family/Significant other/Guardian/POA/by[de-identified] staff     Electronically signed by WILLIAM Loving on 2/4/2022 at 11:57 AM

## 2022-02-04 NOTE — DISCHARGE SUMMARY
DISCHARGE SUMMARY      Patient Identification:   Jana Hoskins   : 1950  MRN: [id number]   Account: [de-identified]      Patient's PCP: Camelia Riggs MD    Admit Date: 2022     Discharge Date:       Admitting Physician: Minta Burkitt, DO     Discharge Physician: María Elena Barker MD     Discharge Diagnoses:    New on Set Afib with RVR - , noted to have new detected Afib with RVR with SOB. Converted with Lopressor, NSR at discharge. Possibly 2/2 electrolytic changes in setting of diarrhea. Echocardiogram WNL, Remains [id number] Score: 2 No OAC at this time. Continue lopressor 12.5 BID. holtzer Monitor x 30 days with, Follow up with Cardiology OPT.     Generalized Weakness - unspecified etiology, likely 2/2 interim electrolyte changes in setting of Diarrhea below. Continues Improving. Electrolytes stable, Continue PT/OT with home health.     Diarrhea - Hx of, covid-19 recent infection, improved , control with imodium symptomatically. Stool studies negative. Complicated by hx of IBS below. .     Hx of IBS - Hx of monitor, see Diarrhea above.     Hx of GERD - Continue PPI, added Pepcid BID.     Recent Covid-19 - Hx of, recent test negative. CXR remarkable for patchy infiltrates. Likely covid-19 lung sequelae. Active Hospital Problems    Diagnosis Date Noted    Dysthymia [F34.1] 2022    History of COVID-19 [Z86.16]     Chronic diarrhea [K52.9]     Generalized weakness [R53.1] 2022    Morbidly obese ([address] [E66.01] 2020    IBS (irritable bowel syndrome) [K58.9]     GERD (gastroesophageal reflux disease) [K21.9]        The patient was seen and examined on day of discharge and this discharge summary is in conjunction with any daily progress note from day of discharge.     Hospital Course:   Jana Hoskins is a 67 y.o. female with PMH of GERD, IBS, TMJ, gastritis, aortic regurgitation admitted to [facility] on 2022 for progressive generalized weakness for 2 weeks associated with diarhhea with noted covid-19 positive status from home test within last 2 weeks. Patient's Covid test was negative however, and Pt was admitted for observation as family felt Pt was unsafe at home 2/2 weakness and decreased PO intake in setting of Diarhhea. Pt was found to have electrolyte deviations like 2/2 diarhhea and was repleted as appropriate. During admission Pt was found to have new onset Afib w RVR which was converted to NSR on lopressor. Pt strength returning and cleared to be DC with home health, DME For walker, and Cardiac telemetry w/ F/U OPT cardiology. Exam:     Vitals:  Vitals:    02/03/22 1530 02/03/22 2146 02/04/22 0229 02/04/22 0800   BP: 125/65 (!) 142/72 (!) 147/72 (!) 141/71   Pulse: 82 82 76 83   Resp: 18 16 20 20   Temp: 98 °F (36.7 °C) 97.7 °F (36.5 °C) 98 °F (36.7 °C) 97.8 °F (36.6 °C)   TempSrc: Oral Oral Oral Oral   SpO2: 92% 94% 91% 92%   Weight:       Height:         Weight: Weight: 180 lb (81.6 kg)     24 hour intake/output:    Intake/Output Summary (Last 24 hours) at 2/4/2022 1552  Last data filed at 2/4/2022 9717  Gross per 24 hour   Intake 320 ml   Output --   Net 320 ml       Physical Exam  Constitutional:       General: She is not in acute distress. Appearance: Normal appearance. She is obese. She is not ill-appearing or diaphoretic. HENT:      Head: Normocephalic and atraumatic. Nose: Nose normal. No congestion or rhinorrhea. Mouth/Throat:      Mouth: Mucous membranes are moist.      Pharynx: Oropharynx is clear. No oropharyngeal exudate or posterior oropharyngeal erythema. Eyes:      General: No scleral icterus. Right eye: No discharge. Left eye: No discharge. Extraocular Movements: Extraocular movements intact. Conjunctiva/sclera: Conjunctivae normal.      Pupils: Pupils are equal, round, and reactive to light.    Cardiovascular:      Rate and Rhythm: Normal rate and regular rhythm. Pulses: Normal pulses. Heart sounds: Normal heart sounds. No murmur heard. No friction rub. No gallop. Pulmonary:      Effort: Pulmonary effort is normal. No respiratory distress. Breath sounds: Normal breath sounds. No wheezing, rhonchi or rales. Abdominal:      General: Abdomen is flat. Bowel sounds are normal. There is no distension. Palpations: Abdomen is soft. Tenderness: There is no abdominal tenderness. There is no guarding. Musculoskeletal:         General: No swelling, tenderness or signs of injury. Cervical back: Normal range of motion and neck supple. No rigidity or tenderness. Right lower leg: No edema. Left lower leg: No edema. Skin:     General: Skin is warm and dry. Capillary Refill: Capillary refill takes 2 to 3 seconds. Coloration: Skin is not jaundiced or pale. Findings: No erythema or rash. Neurological:      General: No focal deficit present. Mental Status: She is alert and oriented to person, place, and time. Labs: For convenience and continuity at follow-up the following most recent labs are provided:      CBC:    Lab Results   Component Value Date    WBC 5.3 02/04/2022    HGB 11.3 02/04/2022    HCT 35.8 02/04/2022     02/04/2022       Renal:    Lab Results   Component Value Date     02/04/2022    K 4.1 02/04/2022    K 4.5 02/01/2022     02/04/2022    CO2 20 02/04/2022    BUN 5 02/04/2022    CREATININE 0.8 02/04/2022    CALCIUM 8.1 02/04/2022    PHOS 3.3 02/04/2022         Significant Diagnostic Studies    Radiology:   XR CHEST PORTABLE   Final Result   Impression:   Moderate bilateral patchy infiltrates. This document has been electronically signed by: Nasima Branch MD on    02/02/2022 06:03 AM      XR CHEST PORTABLE   Final Result   1. Normal heart size. No effusion.    2. Relatively mild Multifocal patchy groundglass infiltrates scattered in both lungs, consistent with Covid pneumonia. **This report has been created using voice recognition software. It may contain minor errors which are inherent in voice recognition technology. **      Final report electronically signed by Dr. Mary Alba on 1/31/2022 7:38 PM             Consults:     IP CONSULT TO SOCIAL WORK    Disposition:    [x] Home       [] TCU       [] Rehab       [] Psych       [] SNF       [] [place]       [] Other-    Condition at Discharge: Stable    Code Status:  Full Code     Patient Instructions:    Discharge lab work: Cardiac Telemetery x 30 days, F/U with Cardiology. Activity: activity as tolerated  Diet: ADULT DIET; Regular      Follow-up visits:   Cristina Clayton MD  [address] [facility] [address] [phone]    Schedule an appointment as soon as possible for a visit  The office is reaching out to the patient to schedule a follow-up appointment. Alejandro Fitzpatrick, [address] [facility] [place] [address] [phone]    Schedule an appointment as soon as possible for a visit in 4 weeks  F/u AFib. - Will need to call and make! Office was closed when called    [facility]  [address] [facility] [id number] [phone]             Discharge Medications:        Medication List      START taking these medications    famotidine 20 MG tablet  Commonly known as: PEPCID  Take 1 tablet by mouth 2 times daily     loperamide 2 MG capsule  Commonly known as: IMODIUM  Take 1 capsule by mouth 4 times daily as needed for Diarrhea     metoprolol tartrate 25 MG tablet  Commonly known as: LOPRESSOR  Take 0.5 tablets by mouth 2 times daily     pantoprazole 40 MG tablet  Commonly known as: PROTONIX  Take 1 tablet by mouth every morning (before breakfast)  Start taking on: February 5, 2022        CHANGE how you take these medications    therapeutic multivitamin-minerals tablet  What changed: Another medication with the same name was removed. Continue taking this medication, and follow the directions you see here. CONTINUE taking these medications    calcium carbonate 500 MG Tabs tablet  Commonly known as: OSCAL     loratadine 10 MG tablet  Commonly known as: CLARITIN     ondansetron 4 MG tablet  Commonly known as: ZOFRAN  Take 1 tablet by mouth 3 times daily as needed for Nausea or Vomiting     pyridoxine 100 MG tablet  Commonly known as: B-6     vitamin D 1000 UNIT Tabs tablet  Commonly known as: CHOLECALCIFEROL        STOP taking these medications    fish oil 1000 MG Caps     mometasone 0.1 % cream  Commonly known as: Elocon     PROBIOTIC & ACIDOPHILUS EX ST PO     Turmeric Curcumin 500 MG Caps     vitamin B-12 1000 MCG tablet  Commonly known as: CYANOCOBALAMIN           Where to Get Your Medications      These medications were sent to [address] [place] [address] , [address]  [address] [place] [address] Floor, [address] [address]    Phone: [phone]   · famotidine 20 MG tablet  · loperamide 2 MG capsule  · metoprolol tartrate 25 MG tablet  · pantoprazole 40 MG tablet         Time Spent on discharge is more than 30 minutes in the examination, evaluation, counseling and review of medications and discharge plan          Signed: Thank you Laruth Opitz, MD for the opportunity to be involved in this patient's care.     Electronically signed by Mack Lopez MD on 2/4/2022 at 3:52 PM

## 2022-02-04 NOTE — PROGRESS NOTES
For Discharge Planning/family requests of Needs for arrangement/home health:    1. Patient needs OT ordered daily, for 1 week in home setting  2. Patient needs OT ordered three times a week for 3 additional weeks after week 1  3. Patient needs daily RN visits for minimum 1 week  4. Patient and dtr Astrid Lamb understand a Holter monitor will be needed ( no order current) at discharge with elected cardiologist for f/u plan  5. Family will need home health care company that is arranged, to order front wheeled walker as privately owned for home setting  6. Family asks for additional concerns to contact dtr Astrid Lamb or .

## 2022-02-04 NOTE — PROGRESS NOTES
Physician Progress Note      PATIENT:               Parisa Canales  CSN #:                  [id number]  :                       1950  ADMIT DATE:       2022 6:30 PM  [address] [place] DATE:  RESPONDING  PROVIDER #:        COURT WILLAMS          QUERY TEXT:    Attending,    Pt admitted with generalized weakness and diarrhea. Pt noted to have a   history of IBS and stool studies are negative. If possible, please respond   below and document in the progress notes and discharge summary the etiology of   the diarrhea. The medical record reflects the following:  Risk Factors: recent COVID-19, IBS, GERD, advanced age  Clinical Indicators: diarrhea; negative stool studies  Treatment: IVF, Imodium, Pepcid, Protonix, labs, I&Os, regular diet    Thank you! Ever Denis, RN, BSN, RHIT, CCDS  RN Clinical   [phone]  Options provided:  -- Diarrhea due to IBS  -- Viral diarrhea  -- Diarrhea due to, please specify. -- Other - I will add my own diagnosis  -- Disagree - Not applicable / Not valid  -- Disagree - Clinically unable to determine / Unknown  -- Refer to Clinical Documentation Reviewer    PROVIDER RESPONSE TEXT:    Provider is clinically unable to determine a response to this query.     Query created by: Hayden Fraser on 2022 11:31 AM      Electronically signed by:  Jennifer Yan 2022 11:33 AM

## 2022-02-04 NOTE — PROGRESS NOTES
[facility]  Occupational Therapy  Daily Note  Time:   Time In: 9292  Time Out: 1223  Timed Code Treatment Minutes: 19 Minutes  Minutes: 19    Date: 2022  Patient Name: Saeed Yun,   Gender: female      Room: [facility]007-A  MRN: [id number]  : 1950  (67 y.o.)  Referring Practitioner: Dr. Tray Moore. Tameena  Diagnosis: Dehydration  Additional Pertinent Hx: Per ER note on 2022:72 y.o. female who presents for progressive weakness in the setting of COVID. Patient has had COVID 19 for 2 weeks. Symptoms include cough, fevers, decrease in appetite, decrease in oral intake with subsequent decrease in urination, nausea, vomiting, diarrhea, postural lightheadedness, and progressive weakness. Patient reports she feels like she has \"cottonmouth. \"  Patient has tried a brat diet and Zofran called in by her physician, but she is not taking in very much and what she does get in \"comes right out. \"  At 4:30 PM patient had a telehealth visit with her PCP who advised she come to the ER due to dehydration and concern for kidney injury. Rapid COVID test negative. Restrictions/Precautions:  Restrictions/Precautions: General Precautions,Fall Risk  Position Activity Restriction  Other position/activity restrictions: 2L O2 on  (no O2 PLOF at home)    SUBJECTIVE: Rn approved OT session, states pt to be discharged home today. Pt resting in recliner upon arrival, agreeable to participating in OT session but declining out of chair activity. PAIN: pt with no complaints of pain    Vitals: Vitals not assessed per clinical judgement, see nursing flowsheet    COGNITION: WFL    ADL:   Feeding: with set-up.  lunch tray. Facilitated discussion on compensatory techniques including energy conservation techniques to be implemented during ADL participation with pt expressing understanding, and stating she and her  have already started implementing some of these things.     BALANCE:  Sitting Balance: Supervision. supported in recliner throughout    BED MOBILITY:  Not Tested    TRANSFERS:  Not tested - pt refused    FUNCTIONAL MOBILITY:  Not tested - pt refused    ADDITIONAL ACTIVITIES:  Patient completed BUE strengthening exercises with skilled education on HEP: completed x10 reps x1 set with a max resistance theraband in all joints and all planes in order to improve UE strength and activity tolerance required for BADL routine and toilet / shower transfers. Patient tolerated fair, requiring brief rest breaks. Patient also required mod cues for technique. Provided pt with handout of HEP and max resistance theraband, with pt then stating theraband too difficult following exercises, provided pt with moderate resistance theraband as well. All questions and concerns addressed for discharge home - pt states her  will be present to assist 24/7 as needed - states \"we help each other out\" and have family and neighbors who check in on them and can assist as well. ASSESSMENT:     Activity Tolerance:  Patient tolerance of  treatment: fair. Discharge Recommendations: Home with Home Health OT  Equipment Recommendations:  Other: Monitor pending progress-may benefit from otto & MARILYN bahena  Plan: Times per week: 5x  Current Treatment Recommendations: Functional Mobility Training,Endurance Training,Safety Education & Training,Self-Care / ADL,Balance Training,Strengthening,Equipment Evaluation, Education, & procurement    Patient Education  Patient Education: Role of OT, Plan of Care, Energy Conservation, Home Exercise Program and Importance of Increasing Activity    Goals  Short term goals  Time Frame for Short term goals: Until discharge  Short term goal 1: Pt will complete various t/fs including toilet with S & 0-1 vcs for safety  Short term goal 2: Pt will tolerate standing 3-4 min with S for increased ease of sinkside grooming  Short term goal 3: Pt will complete LE dressing with S & AE prn (would like otto beltran)  Short term goal 4: Pt will complete mobility to/from bathroom with RW, S, & 0-2 vcs for walker safety  Short term goal 5: Pt will demo indep with BUE moderate resistance HEP for increased UB endurance for eventual returning to IADL tasks  Long term goals  Time Frame for Long term goals : No LTG set d/t short ELOS    Following session, patient left in safe position with all fall risk precautions in place.

## 2022-02-04 NOTE — CARE COORDINATION
[facility] ([facility]) DME notified of orders in place for folding rolling walker.  Electronically signed by Barry Mayer RN on 2/4/22 at 12:41 PM EST

## 2022-02-04 NOTE — PROGRESS NOTES
[facility] 5  Occupational Therapy  Daily Note  Time:   Time In: 1625  Time Out: 1650  Timed Code Treatment Minutes: 25 Minutes  Minutes: 25          Date: 2/3/2022  Patient Name: Shyanne Suazo,   Gender: female      Room: [facility]007-A  MRN: [id number]  : 1950  (67 y.o.)  Referring Practitioner: Dr. Prosper Garay  Diagnosis: Dehydration  Additional Pertinent Hx: Per ER note on 2022:72 y.o. female who presents for progressive weakness in the setting of COVID. Patient has had COVID 19 for 2 weeks. Symptoms include cough, fevers, decrease in appetite, decrease in oral intake with subsequent decrease in urination, nausea, vomiting, diarrhea, postural lightheadedness, and progressive weakness. Patient reports she feels like she has \"cottonmouth. \"  Patient has tried a brat diet and Zofran called in by her physician, but she is not taking in very much and what she does get in \"comes right out. \"  At 4:30 PM patient had a telehealth visit with her PCP who advised she come to the ER due to dehydration and concern for kidney injury. Rapid COVID test negative. Restrictions/Precautions:  Restrictions/Precautions: General Precautions,Fall Risk  Position Activity Restriction  Other position/activity restrictions: 2L O2 on  (no O2 PLOF at home)     SUBJECTIVE: Pleasant and cooperative. Pt was reclined in the chair. Pt agreed to practice using a sock aid and a reacher. She has a reacher at home which she has used to take things out of the cupboard. Pt reported feeling tired and refused to get up for any walking at this time. She had a cough during the session. She reported her appetite was coming back slowly. RN approved session. PAIN: Denies.     Vitals: Vitals not assessed per clinical judgement, see nursing flowsheet    COGNITION: WFL    ADL:   Lower Extremity Dressing: Stand By Assistance.  don/doffing a slipper sock while using a reacher and sock aid with cues provided for each.    BALANCE:  Sitting Balance:  Stand By Assistance. Doing self care while at the edge of the chair    BED MOBILITY:  Not Tested    TRANSFERS:  Did not demonstrate. ADDITIONAL ACTIVITIES:  Pt demonstrated use of the incentive spirometer x 6 reps with min cues. She asked questions about use of the items in the hip kit and how to obtain a kit. Pt also asked about other adaptive equipment for eating as she reported that her  has Parkinson's and that he has been having difficulty using his dominant hand to feed himself. Questions were addressed. ASSESSMENT:     Activity Tolerance:  Patient tolerance of  treatment: fair. Pt was sitting in the chair throughout. She returned to reclined position following session. Discharge Recommendations: Home with Home Health OT  Equipment Recommendations:  Other: Monitor pending progress-may benefit from otto & MARILYN bahena  Plan: Times per week: 5x  Current Treatment Recommendations: Functional Mobility Training,Endurance Training,Safety Education & Training,Self-Care / ADL,Balance Training,Strengthening,Equipment Evaluation, Education, & procurement    Patient Education  Patient Education: ADL's and Equipment Education    Goals  Short term goals  Time Frame for Short term goals: Until discharge  Short term goal 1: Pt will complete various t/fs including toilet with S & 0-1 vcs for safety  Short term goal 2: Pt will tolerate standing 3-4 min with S for increased ease of sinkside grooming  Short term goal 3: Pt will complete LE dressing with S & AE prn (would like sockaide ed)  Short term goal 4: Pt will complete mobility to/from bathroom with RW, S, & 0-2 vcs for walker safety  Short term goal 5: Pt will demo indep with BUE moderate resistance HEP for increased UB endurance for eventual returning to IADL tasks  Long term goals  Time Frame for Long term goals : No LTG set d/t short ELOS    Following session, patient left in safe position with all fall risk precautions in place.

## 2022-02-04 NOTE — PROCEDURES
30 Day Event Monitor was applied to patient. Instructions were given and skin/monitor prep and application was demonstrated. Patient was instructed to remove monitor on 03/06/2020 and mail back to [employer].

## 2022-02-07 ENCOUNTER — CARE COORDINATION (OUTPATIENT)
Dept: FAMILY MEDICINE CLINIC | Age: 72
End: 2022-02-07

## 2022-02-07 ENCOUNTER — CARE COORDINATION (OUTPATIENT)
Dept: CASE MANAGEMENT | Age: 72
End: 2022-02-07

## 2022-02-07 DIAGNOSIS — R53.1 GENERALIZED WEAKNESS: Primary | ICD-10-CM

## 2022-02-07 PROCEDURE — 1111F DSCHRG MED/CURRENT MED MERGE: CPT | Performed by: PHYSICAL MEDICINE & REHABILITATION

## 2022-02-07 NOTE — CARE COORDINATION
[facility] Transitions Initial Follow Up Call    Outreach made within 2 business days of discharge: Yes    Patient: Lisa Hammonds Patient : 1950   MRN: [id number]  Reason for Admission: Loedgarie Cheserene  Discharge Date: 22   [address] coordinator is following. Discharge department/facility: [facility] Patient Contact:    Scheduled appointment with PCP within 7-14 days    Follow Up  No future appointments.     Mulugeta Enriquez RN

## 2022-02-07 NOTE — CARE COORDINATION
[facility] Transitions Initial Follow Up Call    Call within 2 business days of discharge: Yes    Patient: Jayne Luque Patient : 1950   MRN: [id number]  Reason for Admission: Generalized Weakness / A-fib with RVR  Discharge Date: 22 RARS: Readmission Risk Score: 9.7 ( )      Last Discharge [facility]       Complaint Diagnosis Description Type Department Provider    22 Nausea; Fatigue; Positive For Covid-19 Dehydration . .. ED to Hosp-Admission (Discharged) (ADMITTED) [facility] Magui Santos MD; Walker [place]. .. Transitions of Care Initial Call:  Explained the role of Care Transition Nurse and the Transition program, patient is agreeable to follow up calls Post discharge from the Hospital.  Patient states she is still very tired and gets SOB with any exertion. Has Holter Monitor on at this time. Reviewed medications with Patient. Confirmed Patient obtained and is taking medications as directed. There are no questions concerning medications at this time. Medication education provided needed, questions answered, verbalizes understanding. Stressed importance of medication compliance. Advised contacting Pharmacy/MD for refill needs. Expresses understanding. 1111F Medication Reconciliation completed. Routed to PCP. Patient has poor appetite (eating very small amounts) and is trying to drink adequate fluids. Normal bladder elimination patterns. Patient states just had 2 weeks of diarrhea and now not having any BM. Had small formed BM last Thursday 2/3/2022. Instructed patient to drink more fluids and take stool softener. Increase eating as tolerated. Patient ambulating with walker with no needs or issues. Called [facility] they have tried to call patient and will try again later today. Offered patient assistance making PCP appointment, patient Declined. Patient states she will call for PCP appointment in a few days.    Instructed patient that PCP will need to be seen within 7 days of discharge. Expresses understanding. Explained the Transition to Home Program to patient. Patient is called after discharge by CTN to make sure all needs are met and to see if patient has any questions or problems. Expresses understanding. Patient is aware of when to contact MD with any new or worsening symptoms. Advised to contact PCP  with any health concerns for early outpatient intervention in an effort to avoid hospitalization. Report any worsening symptoms to PCP and/or Call 911 and/or GO TO  EMERGENCY ROOM if symptoms are severe. Expresses understanding. Will continue to follow. Brooke Gonzales LPN    [phone]  [employer] / [place] [address] Coordinator    Was this an external facility discharge? No Discharge Facility: [facility]    Challenges to be reviewed by the provider   Additional needs identified to be addressed with provider No  none             Method of communication with provider : none      Advance Care Planning:   Does patient have an Advance Directive:  reviewed and current. Was this a readmission? No  Patient stated reason for admission: Generalized Weakness / A-fib with RVR  Patients top risk factors for readmission: lack of knowledge about disease  medical condition-Generalized Weakness / A-fib with RVR  medication management    Care Transition Nurse (CTN) contacted the patient by telephone to perform post hospital discharge assessment. Verified name and  with patient as identifiers. Provided introduction to self, and explanation of the CTN role. CTN reviewed discharge instructions, medical action plan and red flags with patient who verbalized understanding. Patient given an opportunity to ask questions and does not have any further questions or concerns at this time. Were discharge instructions available to patient? Yes.      Reviewed appropriate site of care based on symptoms and resources available to patient including: PCP, Specialist, Home health and When to call 911. The patient agrees to contact the PCP office for questions related to their healthcare. Medication reconciliation was performed with patient, who verbalizes understanding of administration of home medications. Advised obtaining a 90-day supply of all daily and as-needed medications. Reviewed and educated patient on any new and changed medications related to discharge diagnosis     Was patient discharged with a pulse oximeter? No If Yes - Discussed and confirmed pulse oximeter discharge instructions and when to notify provider or seek emergency care. LPN CC provided contact information. Plan for follow-up call in 5-7 days based on severity of symptoms and risk factors. Non-face-to-face services provided:  Obtained and reviewed discharge summary and/or continuity of care documents    Care Transitions 24 Hour Call    Schedule Follow Up Appointment with PCP: Declined  Do you have any ongoing symptoms?: Yes  Patient-reported symptoms: Shortness of Breath, Weakness, Fatigue  Do you have a copy of your discharge instructions?: Yes  Do you have all of your prescriptions and are they filled?: Yes  Have you been contacted by a [address] Pharmacist?: No  Have you scheduled your follow up appointment?: Yes  How are you going to get to your appointment?: Car - family or friend to transport  Were you discharged with any Home Care or Post Acute Services: Yes  Post Acute Services: Home Health (Comment: [address] - [address])  Do you feel like you have everything you need to keep you well at home?: Yes  Care Transitions Interventions  No Identified Needs         Follow Up  No future appointments.     Alison Martell LPN

## 2022-02-07 NOTE — CARE COORDINATION
Care Transitions Outreach Attempt    Call within 2 business days of discharge: Yes   Attempted to reach patient for transitions of care follow up. Unable to reach patient. Patient: Leda Jordan Patient : 1950 MRN: [id number]    Last Discharge [facility]       Complaint Diagnosis Description Type Department Provider    22 Nausea; Fatigue; Positive For Covid-19 Dehydration . .. ED to Hosp-Admission (Discharged) (ADMITTED) [facility] Vinicius Quintana MD; Aidan Lewis. .. 24 Hour Transition of Care Call:    1st Attempt to contact patient for Initial 24 Hour Transition from hospital to home Call:   Patient not reached. Left HIPAA Compliant message on Voice Mail to call CTN (number given) with any questions and an update on patient's condition since discharge. Called [facility], spoke to Williams Pineda. She States patient was called 22 and left message. They will try again today. Will continue to follow. Yashira Reeder, LPN    [phone]  [facility] / [place] [address] Coordinator          Was this an external facility discharge? No Discharge Facility: [facility]    Noted following upcoming appointments from discharge chart review:   [facility] follow up appointment(s): No future appointments.   Non[facility] follow up appointment(s):

## 2022-02-11 ENCOUNTER — VIRTUAL VISIT (OUTPATIENT)
Dept: FAMILY MEDICINE CLINIC | Age: 72
End: 2022-02-11

## 2022-02-11 DIAGNOSIS — U07.1 COVID-19 VIRUS INFECTION: Primary | ICD-10-CM

## 2022-02-11 DIAGNOSIS — K21.9 GASTROESOPHAGEAL REFLUX DISEASE WITHOUT ESOPHAGITIS: ICD-10-CM

## 2022-02-11 DIAGNOSIS — K52.9 CHRONIC DIARRHEA: ICD-10-CM

## 2022-02-11 DIAGNOSIS — I48.0 PAROXYSMAL ATRIAL FIBRILLATION (HCC): ICD-10-CM

## 2022-02-11 DIAGNOSIS — K58.9 IRRITABLE BOWEL SYNDROME, UNSPECIFIED TYPE: ICD-10-CM

## 2022-02-11 PROCEDURE — 99496 TRANSJ CARE MGMT HIGH F2F 7D: CPT | Performed by: FAMILY MEDICINE

## 2022-02-11 PROCEDURE — 1111F DSCHRG MED/CURRENT MED MERGE: CPT | Performed by: FAMILY MEDICINE

## 2022-02-11 ASSESSMENT — ENCOUNTER SYMPTOMS
SORE THROAT: 0
EYE PAIN: 0
WHEEZING: 0
ABDOMINAL PAIN: 0
CONSTIPATION: 0
COUGH: 0
SHORTNESS OF BREATH: 0
DIARRHEA: 1
CHEST TIGHTNESS: 0
NAUSEA: 0

## 2022-02-11 NOTE — PROGRESS NOTES
Tho Otero agreed to Video Chat/Exam in presence of Dr Ting De Santiago and myself. Verified who was present in room with Tho Otero. Tho Otero informed the e-mail address used to Face Time cannot be used to contact the Provider, if they are any questions or concerns they need to call the office directly. Tho Otero stated understanding.

## 2022-02-11 NOTE — PROGRESS NOTES
Post-Discharge Transitional Care Management Services or Hospital Follow Up      Oly Baxter   YOB: 1950    Date of Office Visit:  2/11/2022  Date of Hospital Admission: 1/31/22  Date of Hospital Discharge: 2/4/22  Readmission Risk Score(high >=14%. Medium >=10%):Readmission Risk Score: 9.7 ( )      Care management risk score Rising risk (score 2-5) and Complex Care (Scores >=6): 1     Non face to face  following discharge, date last encounter closed (first attempt may have been earlier): 2/9/2022 11:23 AM 2/9/2022 11:23 AM    Call initiated 2 business days of discharge: Yes     Patient Active Problem List   Diagnosis    TMJ (dislocation of temporomandibular joint)    IBS (irritable bowel syndrome)    Seasonal allergies    GERD (gastroesophageal reflux disease)    Diverticulosis of colon    Morbidly obese (HCC)    Generalized weakness    Dysthymia    History of COVID-19    Chronic diarrhea       Allergies   Allergen Reactions    Pcn [Penicillins] Hives    Shellfish-Derived Products Diarrhea and Nausea And Vomiting       Medications listed as ordered at the time of discharge from hospital     Medication List          Accurate as of February 11, 2022  3:41 PM. If you have any questions, ask your nurse or doctor.             CONTINUE taking these medications    calcium carbonate 500 MG Tabs tablet  Commonly known as: OSCAL     famotidine 20 MG tablet  Commonly known as: PEPCID  Take 1 tablet by mouth 2 times daily     loperamide 2 MG capsule  Commonly known as: IMODIUM  Take 1 capsule by mouth 4 times daily as needed for Diarrhea     loratadine 10 MG tablet  Commonly known as: CLARITIN     metoprolol tartrate 25 MG tablet  Commonly known as: LOPRESSOR  Take 0.5 tablets by mouth 2 times daily     ondansetron 4 MG tablet  Commonly known as: ZOFRAN  Take 1 tablet by mouth 3 times daily as needed for Nausea or Vomiting     pantoprazole 40 MG tablet  Commonly known as: PROTONIX  Take 1 tablet by mouth every morning (before breakfast)     pyridoxine 100 MG tablet  Commonly known as: B-6     therapeutic multivitamin-minerals tablet     vitamin D 1000 UNIT Tabs tablet  Commonly known as: CHOLECALCIFEROL              Medications marked \"taking\" at this time  Outpatient Medications Marked as Taking for the 2/11/22 encounter (Virtual Visit) with Camelia Riggs MD   Medication Sig Dispense Refill    metoprolol tartrate (LOPRESSOR) 25 MG tablet Take 0.5 tablets by mouth 2 times daily 60 tablet 0    famotidine (PEPCID) 20 MG tablet Take 1 tablet by mouth 2 times daily 60 tablet 0    pantoprazole (PROTONIX) 40 MG tablet Take 1 tablet by mouth every morning (before breakfast) 30 tablet 0    loratadine (CLARITIN) 10 MG tablet Take 10 mg by mouth daily as needed            Medications patient taking as of now reconciled against medications ordered at time of hospital discharge: Yes    Chief Complaint   Patient presents with    Follow-Up from Hospital       HPI    Inpatient course: Discharge summary reviewed- see chart. Interval history/Current status: much  Better  As  covid  With  Diarrhea      transiet  A fib      Feels off  With  Just lopressor 12.5  Mg  Bid      Diarrhea  2  To 3  timns a day and  Imodium      Gi  Use  Of  zofran and  pepcid and  protonix     Review of Systems   Constitutional: Negative for appetite change, fatigue and fever. HENT: Negative for congestion, ear pain, postnasal drip and sore throat. Eyes: Negative for pain and visual disturbance. Respiratory: Negative for cough, chest tightness, shortness of breath and wheezing. Cardiovascular: Negative for chest pain, palpitations and leg swelling. Gastrointestinal: Positive for diarrhea. Negative for abdominal pain, constipation and nausea. Genitourinary: Negative for dysuria and frequency. Musculoskeletal: Negative for arthralgias, joint swelling, neck pain and neck stiffness. Skin: Negative for rash. Neurological: Negative for dizziness, weakness, numbness and headaches. Hematological: Negative for adenopathy. Does not bruise/bleed easily. Psychiatric/Behavioral: Negative for behavioral problems and sleep disturbance. The patient is not nervous/anxious. There were no vitals filed for this visit. There is no height or weight on file to calculate BMI. Wt Readings from Last 3 Encounters:   01/31/22 180 lb (81.6 kg)   09/21/20 197 lb (89.4 kg)   05/21/20 198 lb (89.8 kg)     BP Readings from Last 3 Encounters:   02/04/22 (!) 141/71   09/21/20 136/74   04/09/19 122/64       Physical Exam  Constitutional:       General: She is not in acute distress. Appearance: Normal appearance. She is not ill-appearing or diaphoretic. HENT:      Nose: No congestion. Skin:     Findings: No rash. Neurological:      General: No focal deficit present. Mental Status: She is alert and oriented to person, place, and time. Mental status is at baseline. Cranial Nerves: No cranial nerve deficit. Coordination: Coordination normal.     no  Dyspnea        Video  appt     Assessment/     Diagnosis Orders   1. COVID-19 virus infection     2. Gastroesophageal reflux disease without esophagitis     3. Chronic diarrhea     4. Irritable bowel syndrome, unspecified type     5.  Paroxysmal atrial fibrillation (HCC)           Plan:    Current Outpatient Medications   Medication Sig Dispense Refill    famotidine (PEPCID) 20 MG tablet Take 1 tablet by mouth 2 times daily 60 tablet 0    pantoprazole (PROTONIX) 40 MG tablet Take 1 tablet by mouth every morning (before breakfast) 30 tablet 0    loratadine (CLARITIN) 10 MG tablet Take 10 mg by mouth daily as needed        loperamide (IMODIUM) 2 MG capsule Take 1 capsule by mouth 4 times daily as needed for Diarrhea (Patient not taking: Reported on 2/7/2022) 40 capsule 0    ondansetron (ZOFRAN) 4 MG tablet Take 1 tablet by mouth 3 times daily as needed for Nausea or Vomiting (Patient not taking: Reported on 2/7/2022) 21 tablet 0    pyridoxine (B-6) 100 MG tablet Take 50 mg by mouth daily (Patient not taking: Reported on 1/24/2022)      vitamin D (CHOLECALCIFEROL) 1000 UNIT TABS tablet Take 1,000 Units by mouth daily (Patient not taking: Reported on 1/24/2022)      Multiple Vitamins-Minerals (THERAPEUTIC MULTIVITAMIN-MINERALS) tablet Take 1 tablet by mouth daily. (Patient not taking: Reported on 1/24/2022)      calcium carbonate (OSCAL) 500 MG TABS tablet Take 500 mg by mouth daily. (Patient not taking: Reported on 1/24/2022)       No current facility-administered medications for this visit. Orders Placed This Encounter   Procedures    TSH with Reflex     Standing Status:   Future     Standing Expiration Date:   2/11/2023    CBC Auto Differential     Standing Status:   Future     Standing Expiration Date:   2/11/2023    Basic Metabolic Panel     Standing Status:   Future     Standing Expiration Date:   2/11/2023     There are no diagnoses linked to this encounter.       Medical Decision Making: moderate complexity      May  Stop   Lopressor as feels off      Has the  holter and stopping  2-11-22     lab  2  Weeks and  See in one month

## 2022-02-17 ENCOUNTER — CARE COORDINATION (OUTPATIENT)
Dept: CASE MANAGEMENT | Age: 72
End: 2022-02-17

## 2022-02-17 ENCOUNTER — TELEPHONE (OUTPATIENT)
Dept: CARDIOLOGY CLINIC | Age: 72
End: 2022-02-17

## 2022-02-17 ENCOUNTER — TELEPHONE (OUTPATIENT)
Dept: FAMILY MEDICINE CLINIC | Age: 72
End: 2022-02-17

## 2022-02-17 RX ORDER — BENZONATATE 200 MG/1
200 CAPSULE ORAL 3 TIMES DAILY PRN
Qty: 30 CAPSULE | Refills: 0 | Status: SHIPPED | OUTPATIENT
Start: 2022-02-17 | End: 2022-03-24 | Stop reason: ALTCHOICE

## 2022-02-17 NOTE — TELEPHONE ENCOUNTER
Date of last visit:  2/11/2022  Date of next visit:  Visit date not found    Requested Prescriptions     Signed Prescriptions Disp Refills    benzonatate (TESSALON) 200 MG capsule 30 capsule 0     Sig: Take 1 capsule by mouth 3 times daily as needed for Cough     Authorizing Provider: Gigi Benson     Ordering User: [id number] Jeremiah Carrero informed by Voicemail.

## 2022-02-17 NOTE — CARE COORDINATION
[facility] Transitions Follow Up Call    2022    Patient: Adeline Lew  Patient : 1950   MRN: [id number]  Reason for Admission:  Generalized Weakness / A-fib with RVR  Discharge Date: 22 RARS: Readmission Risk Score: 9.7 ( )    Care Transitions Follow Up Call:  Called and spoke to patient. Patient states she is doiing well. Still gets a little SOB at times. Has dry non productive cough and PCP ordered her Tessolon Perles for it with relief. Much less tired than before. Continues wearing the 30 day Cardiac Monitor. Patient has good appetite (eating small amounts frequently) and is drinking adequate fluids. Normal bladder and bowel elimination patterns. Having BM's daily. Walking independently with no needs or issues. Patient Denies Transportation, Home, or Medication needs or concerns at this time. Patient had Transitional Follow up appointment. 2022  Patient is aware of when to contact MD with any new or worsening symptoms. Advised to contact PCP  with any health concerns for early outpatient intervention in an effort to avoid hospitalization. Report any worsening symptoms to PCP and/or Call 911 and/or GO TO  EMERGENCY ROOM if symptoms are severe. Expresses understanding. Instructed patient that this is the Final Transition Call and to call their Specialist/PCP for any problems or issues that may occur. Expresses understanding. Elsie Wise LPN    [phone]  Reynaldo Ramirez / [facility] Coordinator      Needs to be reviewed by the provider   Additional needs identified to be addressed with provider No  none             Method of communication with provider : none      Care Transition Nurse (CTN) contacted the patient by telephone to follow up after admission on 2022. Verified name and  with patient as identifiers. Discussed follow-up appointments. If no appointment was previously scheduled, appointment scheduling offered:  Yes Is follow up appointment scheduled within 7 days of discharge? Yes    Advance Care Planning:   Does patient have an Advance Directive:  reviewed and current. CTN reviewed discharge instructions, medical action plan and red flags with patient and discussed any barriers to care and/or understanding of plan of care after discharge. Discussed appropriate site of care based on symptoms and resources available to patient including: PCP and When to call 911. The patient agrees to contact the PCP office for questions related to their healthcare. Patients top risk factors for readmission: lack of knowledge about disease  medical condition-v Generalized Weakness / A-fib with RVR  medication management  Interventions to address risk factors: Obtained and reviewed discharge summary and/or continuity of care documents    Non-[facility] follow up appointment(s): 2/11/2022    CTN provided contact information for future needs. No further follow-up call identified based on severity of symptoms and risk factors. Plan for next call:              Care Transitions Subsequent and Final Call    Subsequent and Final Calls  Do you have any ongoing symptoms?: No  Have your medications changed?: No  Do you have any questions related to your medications?: No  Do you currently have any active services?: No  Are you currently active with any services?: Home Health  Do you have any needs or concerns that I can assist you with?: No  Identified Barriers: None  Care Transitions Interventions  Other Interventions:            Follow Up  Future Appointments   Date Time Provider Katlin Mcginnis   3/17/2022 12:00 PM Lakisha Quinn MD [address], [place]

## 2022-02-17 NOTE — TELEPHONE ENCOUNTER
Patient called C/O persistent cough. She took one of her granddaughter's Benzonatate 200mg capsule and it seemed to help her. She would like an RX for this or something similar.     Uses [facility] [place].    Please call her [phone]

## 2022-02-20 ENCOUNTER — APPOINTMENT (OUTPATIENT)
Dept: INTERVENTIONAL RADIOLOGY/VASCULAR | Age: 72
End: 2022-02-20
Payer: MEDICARE

## 2022-02-20 ENCOUNTER — HOSPITAL ENCOUNTER (EMERGENCY)
Age: 72
Discharge: HOME | End: 2022-02-20
Attending: STUDENT IN AN ORGANIZED HEALTH CARE EDUCATION/TRAINING PROGRAM
Payer: MEDICARE

## 2022-02-20 VITALS
TEMPERATURE: 98.2 F | DIASTOLIC BLOOD PRESSURE: 88 MMHG | HEART RATE: 99 BPM | OXYGEN SATURATION: 96 % | RESPIRATION RATE: 18 BRPM | SYSTOLIC BLOOD PRESSURE: 160 MMHG

## 2022-02-20 DIAGNOSIS — I82.4Y3 DEEP VEIN THROMBOSIS (DVT) OF PROXIMAL VEIN OF BOTH LOWER EXTREMITIES, UNSPECIFIED CHRONICITY (HCC): Primary | ICD-10-CM

## 2022-02-20 LAB
ANION GAP SERPL CALCULATED.3IONS-SCNC: 13 MEQ/L (ref 8–16)
BASOPHILS # BLD: 0.4 %
BASOPHILS ABSOLUTE: 0 THOU/MM3 (ref 0–0.1)
BUN BLDV-MCNC: 14 MG/DL (ref 7–22)
CALCIUM SERPL-MCNC: 9.2 MG/DL (ref 8.5–10.5)
CHLORIDE BLD-SCNC: 101 MEQ/L (ref 98–111)
CO2: 23 MEQ/L (ref 23–33)
CREAT SERPL-MCNC: 1 MG/DL (ref 0.4–1.2)
EOSINOPHIL # BLD: 4 %
EOSINOPHILS ABSOLUTE: 0.3 THOU/MM3 (ref 0–0.4)
ERYTHROCYTE [DISTWIDTH] IN BLOOD BY AUTOMATED COUNT: 13.3 % (ref 11.5–14.5)
ERYTHROCYTE [DISTWIDTH] IN BLOOD BY AUTOMATED COUNT: 43.1 FL (ref 35–45)
GFR SERPL CREATININE-BSD FRML MDRD: 54 ML/MIN/1.73M2
GLUCOSE BLD-MCNC: 123 MG/DL (ref 70–108)
HCT VFR BLD CALC: 41.8 % (ref 37–47)
HEMOGLOBIN: 13.1 GM/DL (ref 12–16)
IMMATURE GRANS (ABS): 0.02 THOU/MM3 (ref 0–0.07)
IMMATURE GRANULOCYTES: 0.2 %
LYMPHOCYTES # BLD: 25.8 %
LYMPHOCYTES ABSOLUTE: 2.1 THOU/MM3 (ref 1–4.8)
MCH RBC QN AUTO: 27.8 PG (ref 26–33)
MCHC RBC AUTO-ENTMCNC: 31.3 GM/DL (ref 32.2–35.5)
MCV RBC AUTO: 88.7 FL (ref 81–99)
MONOCYTES # BLD: 10.4 %
MONOCYTES ABSOLUTE: 0.8 THOU/MM3 (ref 0.4–1.3)
NUCLEATED RED BLOOD CELLS: 0 /100 WBC
OSMOLALITY CALCULATION: 275.7 MOSMOL/KG (ref 275–300)
PLATELET # BLD: 226 THOU/MM3 (ref 130–400)
PMV BLD AUTO: 9.7 FL (ref 9.4–12.4)
POTASSIUM REFLEX MAGNESIUM: 3.9 MEQ/L (ref 3.5–5.2)
RBC # BLD: 4.71 MILL/MM3 (ref 4.2–5.4)
SEG NEUTROPHILS: 59.2 %
SEGMENTED NEUTROPHILS ABSOLUTE COUNT: 4.8 THOU/MM3 (ref 1.8–7.7)
SODIUM BLD-SCNC: 137 MEQ/L (ref 135–145)
WBC # BLD: 8.1 THOU/MM3 (ref 4.8–10.8)

## 2022-02-20 PROCEDURE — 6370000000 HC RX 637 (ALT 250 FOR IP): Performed by: STUDENT IN AN ORGANIZED HEALTH CARE EDUCATION/TRAINING PROGRAM

## 2022-02-20 PROCEDURE — 80048 BASIC METABOLIC PNL TOTAL CA: CPT

## 2022-02-20 PROCEDURE — 99282 EMERGENCY DEPT VISIT SF MDM: CPT

## 2022-02-20 PROCEDURE — 85025 COMPLETE CBC W/AUTO DIFF WBC: CPT

## 2022-02-20 PROCEDURE — 36415 COLL VENOUS BLD VENIPUNCTURE: CPT

## 2022-02-20 PROCEDURE — 93970 EXTREMITY STUDY: CPT

## 2022-02-20 RX ORDER — CYCLOBENZAPRINE HCL 10 MG
5 TABLET ORAL 2 TIMES DAILY PRN
Qty: 10 TABLET | Refills: 0 | Status: SHIPPED | OUTPATIENT
Start: 2022-02-20 | End: 2022-03-02

## 2022-02-20 RX ADMIN — APIXABAN 10 MG: 5 TABLET, FILM COATED ORAL at 22:47

## 2022-02-20 ASSESSMENT — ENCOUNTER SYMPTOMS
CONSTIPATION: 0
SORE THROAT: 0
BACK PAIN: 0
RHINORRHEA: 0
VOMITING: 0
ABDOMINAL PAIN: 0
EYE REDNESS: 0
DIARRHEA: 0
NAUSEA: 0
SHORTNESS OF BREATH: 0

## 2022-02-20 NOTE — ED PROVIDER NOTES
[facility] ENCOUNTER          Pt Name: Dipak Kaye  MRN: [id number]  [place] 1950  Date of evaluation: 2/20/2022  Physician: Lauro Quesada MD    CHIEF COMPLAINT       Chief Complaint   Patient presents with    Leg Pain     History obtained from the patient. HISTORY OF PRESENT ILLNESS    HPI  Dipak Kaye is a 67 y.o. female with a history of IBS, GERD, recent COVID-19 infection who presents to the emergency department for evaluation of right calf pain. She states that it has been worsening over the past 3 days. She was recently admitted to the hospital for COVID-19 with an episode of Afib with RVR thought to be secondary to electrolyte abnormalities. She was told at that time, to return to ED if she develops any calf pain due to concern for DVT. She denies a personal history of DVT, recent surgeries or travel. Of note, her daughter passed away in September 2021 from a PE associated with COVID-19. Patient states the pain is an 8/10 in intensity and feels like an aching sensation in the right calf. She had similar pain in the left calf for a few days that has improved. She denies CP/SOB, injury to the leg, nausea/vomiting, abdominal pain. She denies leg swelling. The patient has no other acute complaints at this time. REVIEW OF SYSTEMS   Review of Systems   Constitutional: Negative for chills and fever. HENT: Negative for rhinorrhea and sore throat. Eyes: Negative for redness and visual disturbance. Respiratory: Negative for shortness of breath. Cardiovascular: Negative for chest pain. Gastrointestinal: Negative for abdominal pain, constipation, diarrhea, nausea and vomiting. Endocrine: Negative for polyuria. Genitourinary: Negative for dysuria and flank pain. Musculoskeletal: Negative for back pain and myalgias. +Calf pain   Skin: Negative for rash. Neurological: Negative for syncope and headaches. Psychiatric/Behavioral: Negative for confusion. PAST MEDICAL AND SURGICAL HISTORY     Past Medical History:   Diagnosis Date    Aortic regurgitation     Diverticulosis of colon     Gastritis     GERD (gastroesophageal reflux disease)     IBS (irritable bowel syndrome)     Seasonal allergies     TMJ (dislocation of temporomandibular joint)      Past Surgical History:   Procedure Laterality Date    APPENDECTOMY       SECTION       SECTION      COLONOSCOPY  2007    [place]    HYSTERECTOMY  1994    bso    MANDIBLE SURGERY           MEDICATIONS   No current facility-administered medications for this encounter. Current Outpatient Medications:     apixaban starter pack (ELIQUIS DVT/PE STARTER PACK) 5 MG TBPK tablet, Take 1 tablet by mouth See Admin Instructions, Disp: 74 tablet, Rfl: 0    cyclobenzaprine (FLEXERIL) 10 MG tablet, Take 0.5 tablets by mouth 2 times daily as needed for Muscle spasms, Disp: 10 tablet, Rfl: 0    benzonatate (TESSALON) 200 MG capsule, Take 1 capsule by mouth 3 times daily as needed for Cough, Disp: 30 capsule, Rfl: 0    famotidine (PEPCID) 20 MG tablet, Take 1 tablet by mouth 2 times daily, Disp: 60 tablet, Rfl: 0    pantoprazole (PROTONIX) 40 MG tablet, Take 1 tablet by mouth every morning (before breakfast), Disp: 30 tablet, Rfl: 0    ondansetron (ZOFRAN) 4 MG tablet, Take 1 tablet by mouth 3 times daily as needed for Nausea or Vomiting (Patient not taking: Reported on 2022), Disp: 21 tablet, Rfl: 0    pyridoxine (B-6) 100 MG tablet, Take 50 mg by mouth daily (Patient not taking: Reported on 2022), Disp: , Rfl:     vitamin D (CHOLECALCIFEROL) 1000 UNIT TABS tablet, Take 1,000 Units by mouth daily (Patient not taking: Reported on 2022), Disp: , Rfl:     Multiple Vitamins-Minerals (THERAPEUTIC MULTIVITAMIN-MINERALS) tablet, Take 1 tablet by mouth daily.  (Patient not taking: Reported on 2022), Disp: , Rfl:    calcium carbonate (OSCAL) 500 MG TABS tablet, Take 500 mg by mouth daily. (Patient not taking: Reported on 1/24/2022), Disp: , Rfl:     loratadine (CLARITIN) 10 MG tablet, Take 10 mg by mouth daily as needed  , Disp: , Rfl:       SOCIAL HISTORY     Social History     Social History Narrative    Not on file     Social History     Tobacco Use    Smoking status: Never Smoker    Smokeless tobacco: Never Used   Substance Use Topics    Alcohol use: No    Drug use: No         ALLERGIES     Allergies   Allergen Reactions    Pcn [Penicillins] Hives    Shellfish-Derived Products Diarrhea and Nausea And Vomiting         FAMILY HISTORY     Family History   Problem Relation Age of Onset    Glaucoma Mother     Stroke Father     Cancer Father         colon         PREVIOUS RECORDS   Previous records reviewed:   Discharge summary 2/8 after being admitted for Afib secondary to electrolyte derangements. PHYSICAL EXAM     ED Triage Vitals [02/20/22 1726]   BP Temp Temp src Pulse Resp SpO2 Height Weight   (!) 160/88 -- -- 99 18 96 % -- --     Initial vital signs and nursing assessment reviewed and normal. There is no height or weight on file to calculate BMI. Pulsoximetry is normal per my interpretation. Additional Vital Signs:  Vitals:    02/20/22 1726   BP: (!) 160/88   Pulse: 99   Resp: 18   Temp: 98.2 °F (36.8 °C)   SpO2: 96%       Physical Exam  Vitals and nursing note reviewed. Constitutional:       General: She is not in acute distress. Appearance: Normal appearance. HENT:      Head: Normocephalic and atraumatic. Mouth/Throat:      Mouth: Mucous membranes are moist.   Eyes:      Extraocular Movements: Extraocular movements intact. Conjunctiva/sclera: Conjunctivae normal.      Pupils: Pupils are equal, round, and reactive to light. Cardiovascular:      Rate and Rhythm: Normal rate and regular rhythm. Pulses: Normal pulses. Heart sounds: Normal heart sounds.    Pulmonary: Effort: Pulmonary effort is normal.      Breath sounds: Normal breath sounds. Abdominal:      General: Abdomen is flat. Palpations: Abdomen is soft. Tenderness: There is no abdominal tenderness. Musculoskeletal:         General: Normal range of motion. Cervical back: Normal range of motion and neck supple. Comments: Right calf tenderness to palpation, no significant swelling. 2+ DP and PT pulses in bilateral lower extremities. Skin:     General: Skin is warm and dry. Capillary Refill: Capillary refill takes less than 2 seconds. Neurological:      General: No focal deficit present. Mental Status: She is alert and oriented to person, place, and time. Psychiatric:         Mood and Affect: Mood normal.         Behavior: Behavior normal.             MEDICAL DECISION MAKING   Initial Assessment:   Alyssa Bullock is a 67 y.o. female with a history of IBS, GERD, recent COVID-19 infection who presents to the emergency department for evaluation of right calf pain. Patient hemodynamically stable and in no distress. Differential diagnosis includes but is not limited to DVT, muscle spasm, deconditioning due to recent illness and hospitalization, muscle strain.     Plan:    Lower extremity venous ultrasound   Patient declined medication for pain at this time        ED RESULTS   Laboratory results:  Labs Reviewed   CBC WITH AUTO DIFFERENTIAL - Abnormal; Notable for the following components:       Result Value    MCHC 31.3 (*)     All other components within normal limits   BASIC METABOLIC PANEL W/ REFLEX TO MG FOR LOW K - Abnormal; Notable for the following components:    Glucose 123 (*)     All other components within normal limits   GLOMERULAR FILTRATION RATE, ESTIMATED - Abnormal; Notable for the following components:    Est, Glom Filt Rate 54 (*)     All other components within normal limits   ANION GAP   OSMOLALITY       Radiologic studies results:  VL DUP LOWER EXTREMITY VENOUS BILATERAL   Final Result      1. There is a mixed acute and chronic appearing thrombus within the right popliteal vein. 2. Hypoechoic acute appearing thrombus is present within the right peroneal vein. However there is chronic appearing thrombus within one of the paired right posterior tibial veins. 3. There is chronic appearing thrombus with partial compressibility within the left popliteal vein. 4. There is hypoechoic appearing thrombus within one of the paired left posterior tibial veins which likely represents acute thrombus. There is also hypoechoic appearing thrombus within the midportion of the left peroneal vein which appears acute. **This report has been created using voice recognition software. It may contain minor errors which are inherent in voice recognition technology. **      Final report electronically signed by Dr. Siva Harley on 2/20/2022 8:54 PM                ED COURSE   ED Medications administered this visit:   Medications   apixaban (ELIQUIS) tablet 10 mg (10 mg Oral Given 2/20/22 0758)     Lower extremity ultrasound shows acute on chronic DVT of the right popliteal vein, acute DVT of the right peroneal vein with chronic DVT of right posterior tibial vein, chronic thrombus with partial compressibility in the left popliteal vein, acute DVT of the left posterior tibial vein and left peroneal vein. Basic labs were sent to evaluate renal function and hemoglobin to start anticoagulation. Patient given first dose of Eliquis while in the emergency department. Patient was provided with 30-day prescription/starter pack for Eliquis. Patient was instructed to follow-up with her PCP within the next 1 to 2 days for follow-up and to establish care for longer period of anticoagulation. Strict return precautions and risks of starting Eliquis were discussed with the patient including any increased risks of bleeding, need to be evaluated if she has any trauma/head trauma.   Return precautions/chance for PE were discussed with the patient and she was instructed to return to the emergency department for any chest pain, shortness of breath, syncope. Patient verbalized agreement and understanding with this plan. Patient requesting muscle relaxer to help with pain while sleeping at night. Patient was instructed to only take this while she is sleeping as it could make her feel sleepy or increase chance of falls and given new prescription for Eliquis she stated she would only take it while sleeping. Patient verbalized agreement and understanding with this plan and was discharged in stable condition. MEDICATION CHANGES     Discharge Medication List as of 2/20/2022 11:03 PM      START taking these medications    Details   apixaban starter pack (ELIQUIS DVT/PE STARTER PACK) 5 MG TBPK tablet Take 1 tablet by mouth See Admin Instructions, Disp-74 tablet, R-0Print      cyclobenzaprine (FLEXERIL) 10 MG tablet Take 0.5 tablets by mouth 2 times daily as needed for Muscle spasms, Disp-10 tablet, R-0Print               FINAL DISPOSITION     Final diagnoses:   Deep vein thrombosis (DVT) of proximal vein of both lower extremities, unspecified chronicity (HCC)     Condition: condition: good  Dispo: Discharge to home      This transcription was electronically signed. It was dictated by use of voice recognition software and electronically transcribed. The transcription may contain errors not detected in proofreading.         Lasha Hamm MD  02/21/22 0030

## 2022-02-20 NOTE — ED NOTES
Pt to the ED via self. Patient presents with complaints of leg pain. Patient states that she has been off her feet for 4 week. Patient is alert and oriented x 4. Respirations are regular and unlabored. Patient provided blanket. Family at the bedside and call light within reach.      Elizabeth Liz RN  02/20/22 8133

## 2022-02-21 ENCOUNTER — TELEMEDICINE (OUTPATIENT)
Dept: FAMILY MEDICINE CLINIC | Age: 72
End: 2022-02-21

## 2022-02-21 DIAGNOSIS — I82.433 ACUTE DEEP VEIN THROMBOSIS (DVT) OF POPLITEAL VEIN OF BOTH LOWER EXTREMITIES (HCC): Primary | ICD-10-CM

## 2022-02-21 DIAGNOSIS — K21.9 GASTROESOPHAGEAL REFLUX DISEASE WITHOUT ESOPHAGITIS: ICD-10-CM

## 2022-02-21 DIAGNOSIS — R53.1 GENERALIZED WEAKNESS: ICD-10-CM

## 2022-02-21 DIAGNOSIS — Z86.16 HISTORY OF COVID-19: ICD-10-CM

## 2022-02-21 DIAGNOSIS — F34.1 DYSTHYMIA: ICD-10-CM

## 2022-02-21 PROCEDURE — 99213 OFFICE O/P EST LOW 20 MIN: CPT | Performed by: FAMILY MEDICINE

## 2022-02-21 PROCEDURE — G8427 DOCREV CUR MEDS BY ELIG CLIN: HCPCS | Performed by: FAMILY MEDICINE

## 2022-02-21 PROCEDURE — G8400 PT W/DXA NO RESULTS DOC: HCPCS | Performed by: FAMILY MEDICINE

## 2022-02-21 PROCEDURE — 1111F DSCHRG MED/CURRENT MED MERGE: CPT | Performed by: FAMILY MEDICINE

## 2022-02-21 PROCEDURE — G8417 CALC BMI ABV UP PARAM F/U: HCPCS | Performed by: FAMILY MEDICINE

## 2022-02-21 PROCEDURE — 1036F TOBACCO NON-USER: CPT | Performed by: FAMILY MEDICINE

## 2022-02-21 ASSESSMENT — ENCOUNTER SYMPTOMS
COUGH: 0
EYE PAIN: 0
SORE THROAT: 0
CHEST TIGHTNESS: 0
SHORTNESS OF BREATH: 1
WHEEZING: 0
NAUSEA: 0
ABDOMINAL PAIN: 0
CONSTIPATION: 0

## 2022-02-21 NOTE — PROGRESS NOTES
Lori Ray (:  1950) is a Established patient, here for evaluation of the following:    Assessment & Plan          Diagnosis Orders   1. Acute deep vein thrombosis (DVT) of popliteal vein of both lower extremities (HCC)     2. History of COVID-19     3. Generalized weakness     4. Dysthymia     5. Gastroesophageal reflux disease without esophagitis           Plan   Current Outpatient Medications   Medication Sig Dispense Refill    apixaban starter pack (ELIQUIS DVT/PE STARTER PACK) 5 MG TBPK tablet Take 1 tablet by mouth See Admin Instructions 74 tablet 0    cyclobenzaprine (FLEXERIL) 10 MG tablet Take 0.5 tablets by mouth 2 times daily as needed for Muscle spasms 10 tablet 0    benzonatate (TESSALON) 200 MG capsule Take 1 capsule by mouth 3 times daily as needed for Cough 30 capsule 0    famotidine (PEPCID) 20 MG tablet Take 1 tablet by mouth 2 times daily 60 tablet 0    pantoprazole (PROTONIX) 40 MG tablet Take 1 tablet by mouth every morning (before breakfast) 30 tablet 0    loratadine (CLARITIN) 10 MG tablet Take 10 mg by mouth daily as needed        ondansetron (ZOFRAN) 4 MG tablet Take 1 tablet by mouth 3 times daily as needed for Nausea or Vomiting (Patient not taking: Reported on 2022) 21 tablet 0    pyridoxine (B-6) 100 MG tablet Take 50 mg by mouth daily (Patient not taking: Reported on 2022)      vitamin D (CHOLECALCIFEROL) 1000 UNIT TABS tablet Take 1,000 Units by mouth daily (Patient not taking: Reported on 2022)      Multiple Vitamins-Minerals (THERAPEUTIC MULTIVITAMIN-MINERALS) tablet Take 1 tablet by mouth daily. (Patient not taking: Reported on 2022)      calcium carbonate (OSCAL) 500 MG TABS tablet Take 500 mg by mouth daily. (Patient not taking: Reported on 2022)       No current facility-administered medications for this visit. No orders of the defined types were placed in this encounter.         See in one  Month and  Lab  Before  Umberto Stable and on    eliquis    Subjective   HPI        bilat  dvt  Right  Worse  Left  Leg        Recent  coivi  gerd      Review of Systems   Constitutional: Negative for appetite change, fatigue and fever. HENT: Negative for congestion, ear pain, postnasal drip and sore throat. Eyes: Negative for pain and visual disturbance. Respiratory: Positive for shortness of breath. Negative for cough, chest tightness and wheezing. Cardiovascular: Negative for chest pain, palpitations and leg swelling. Gastrointestinal: Negative for abdominal pain, constipation and nausea. Genitourinary: Negative for dysuria and frequency. Musculoskeletal: Negative for arthralgias, joint swelling, neck pain and neck stiffness. Skin: Negative for rash. Neurological: Negative for dizziness, weakness, numbness and headaches. Hematological: Negative for adenopathy. Does not bruise/bleed easily. Psychiatric/Behavioral: Negative for behavioral problems and sleep disturbance. The patient is not nervous/anxious.            Objective   Patient-Reported Vitals  No data recorded     Physical Exam  [INSTRUCTIONS:  \"[x]\" Indicates a positive item  \"[]\" Indicates a negative item  -- DELETE ALL ITEMS NOT EXAMINED]    Constitutional: [x] Appears well-developed and well-nourished [x] No apparent distress      [] Abnormal -     Mental status: [x] Alert and awake  [x] Oriented to person/place/time [x] Able to follow commands    [] Abnormal -     Eyes:   EOM    [x]  Normal    [] Abnormal -   Sclera  [x]  Normal    [] Abnormal -          Discharge [x]  None visible   [] Abnormal -     HENT: [x] Normocephalic, atraumatic  [] Abnormal -   [x] Mouth/Throat: Mucous membranes are moist    External Ears [x] Normal  [] Abnormal -    Neck: [x] No visualized mass [] Abnormal -     Pulmonary/Chest: [x] Respiratory effort normal   [x] No visualized signs of difficulty breathing or respiratory distress      No dyspnea             [] Abnormal - Musculoskeletal:   [x] Normal gait with no signs of ataxia         [x] Normal range of motion of neck        [] Abnormal -     Leg  Pain  No  Swelling and   The    Right  Leg  With  Calf  Pain and none  On  Left   Neurological:        [x] No Facial Asymmetry (Cranial nerve 7 motor function) (limited exam due to video visit)          [x] No gaze palsy        [] Abnormal -          Skin:        [x] No significant exanthematous lesions or discoloration noted on facial skin         [] Abnormal -            No leg  edema  Psychiatric:       [x] Normal Affect [] Abnormal -        [x] No Hallucinations    Other pertinent observable physical exam findings:-               Micki Altamirano, was evaluated through a synchronous (real-time) audio-video encounter. The patient (or guardian if applicable) is aware that this is a billable service, which includes applicable co-pays. This Virtual Visit was conducted with patient's (and/or legal guardian's) consent. The visit was conducted pursuant to the emergency declaration under the [address] [place] [address] authority and the [employer] General Act. Patient identification was verified, and a caregiver was present when appropriate. The patient was located at home in a state where the provider was licensed to provide care.        --Indu Barrett MD

## 2022-02-21 NOTE — PROGRESS NOTES
Sarah Andres agreed to Video Chat/Exam in presence of Dr Yani Freeman and myself. Verified who was present in room with Sarah Andres. Sarah Andres informed the e-mail address used to Face Time cannot be used to contact the Provider, if they are any questions or concerns they need to call the office directly. Sarah Andres stated understanding.

## 2022-03-06 RX ORDER — PANTOPRAZOLE SODIUM 40 MG/1
40 TABLET, DELAYED RELEASE ORAL
Qty: 30 TABLET | Refills: 1 | Status: SHIPPED | OUTPATIENT
Start: 2022-03-06 | End: 2022-03-17

## 2022-03-09 NOTE — PROCEDURES
[address] [place] [address]                                 EVENT MONITOR    PATIENT NAME: Loren French                     :        1950  MED REC NO:   [id number]                           ROOM:       0007  ACCOUNT NO:   [de-identified]                           ADMIT DATE: 2022  PROVIDER:     Alan Mendoza M.D.    TEST TYPE:  Event monitor. CLINICAL HISTORY AND INDICATION:  This is a patient with AFib. EVENT MONITOR DESCRIPTION:   Event monitor was attached to the patient  between 2022 and 2022. EVENT MONITOR DESCRIPTION:  Baseline rhythm showed sinus rhythm with  short intermittent episodes of atrial fibrillation, rapid ventricular  response noted. Episodes of wide complex tachycardia was also noted,  could be nonsustained ventricular tachycardia versus AFib with  aberrancy. This is an abnormal event monitor. CONCLUSION:  1. Abnormal event monitor. 2.  Intermittent atrial fibrillation with rapid ventricular response. 3.  Cannot completely exclude short bursts of nonsustained V-tach versus  AFib with aberrant conduction. Clinical correlation is recommended.         Lyubov Haynes M.D.    D: 2022 7:25:00       T: 2022 7:28:03     [email or web address]  Job#: [id number]     Doc#: [id number]    CC:

## 2022-03-11 ENCOUNTER — NURSE ONLY (OUTPATIENT)
Dept: LAB | Age: 72
End: 2022-03-11

## 2022-03-11 DIAGNOSIS — I48.0 PAROXYSMAL ATRIAL FIBRILLATION (HCC): ICD-10-CM

## 2022-03-11 LAB
ANION GAP SERPL CALCULATED.3IONS-SCNC: 13 MEQ/L (ref 8–16)
BASOPHILS # BLD: 0.2 %
BASOPHILS ABSOLUTE: 0 THOU/MM3 (ref 0–0.1)
BUN BLDV-MCNC: 13 MG/DL (ref 7–22)
CALCIUM SERPL-MCNC: 9.3 MG/DL (ref 8.5–10.5)
CHLORIDE BLD-SCNC: 103 MEQ/L (ref 98–111)
CO2: 21 MEQ/L (ref 23–33)
CREAT SERPL-MCNC: 0.9 MG/DL (ref 0.4–1.2)
EOSINOPHIL # BLD: 3.7 %
EOSINOPHILS ABSOLUTE: 0.2 THOU/MM3 (ref 0–0.4)
ERYTHROCYTE [DISTWIDTH] IN BLOOD BY AUTOMATED COUNT: 14.6 % (ref 11.5–14.5)
ERYTHROCYTE [DISTWIDTH] IN BLOOD BY AUTOMATED COUNT: 48.5 FL (ref 35–45)
GFR SERPL CREATININE-BSD FRML MDRD: 62 ML/MIN/1.73M2
GLUCOSE BLD-MCNC: 131 MG/DL (ref 70–108)
HCT VFR BLD CALC: 40.8 % (ref 37–47)
HEMOGLOBIN: 12.7 GM/DL (ref 12–16)
IMMATURE GRANS (ABS): 0.01 THOU/MM3 (ref 0–0.07)
IMMATURE GRANULOCYTES: 0.2 %
LYMPHOCYTES # BLD: 32 %
LYMPHOCYTES ABSOLUTE: 2 THOU/MM3 (ref 1–4.8)
MCH RBC QN AUTO: 28.2 PG (ref 26–33)
MCHC RBC AUTO-ENTMCNC: 31.1 GM/DL (ref 32.2–35.5)
MCV RBC AUTO: 90.5 FL (ref 81–99)
MONOCYTES # BLD: 9 %
MONOCYTES ABSOLUTE: 0.6 THOU/MM3 (ref 0.4–1.3)
NUCLEATED RED BLOOD CELLS: 0 /100 WBC
PLATELET # BLD: 248 THOU/MM3 (ref 130–400)
PMV BLD AUTO: 10.4 FL (ref 9.4–12.4)
POTASSIUM SERPL-SCNC: 4.7 MEQ/L (ref 3.5–5.2)
RBC # BLD: 4.51 MILL/MM3 (ref 4.2–5.4)
SEG NEUTROPHILS: 54.9 %
SEGMENTED NEUTROPHILS ABSOLUTE COUNT: 3.4 THOU/MM3 (ref 1.8–7.7)
SODIUM BLD-SCNC: 137 MEQ/L (ref 135–145)
TSH SERPL DL<=0.05 MIU/L-ACNC: 0.98 UIU/ML (ref 0.4–4.2)
WBC # BLD: 6.2 THOU/MM3 (ref 4.8–10.8)

## 2022-03-14 ENCOUNTER — TELEPHONE (OUTPATIENT)
Dept: FAMILY MEDICINE CLINIC | Age: 72
End: 2022-03-14

## 2022-03-14 NOTE — TELEPHONE ENCOUNTER
----- Message from Juan Sequeira MD sent at 3/12/2022  5:13 PM EST -----  Call all labs good including the thyroid

## 2022-03-17 ENCOUNTER — OFFICE VISIT (OUTPATIENT)
Dept: CARDIOLOGY CLINIC | Age: 72
End: 2022-03-17
Payer: MEDICARE

## 2022-03-17 VITALS
BODY MASS INDEX: 37.09 KG/M2 | HEIGHT: 59 IN | DIASTOLIC BLOOD PRESSURE: 80 MMHG | WEIGHT: 184 LBS | HEART RATE: 96 BPM | SYSTOLIC BLOOD PRESSURE: 162 MMHG

## 2022-03-17 DIAGNOSIS — I48.0 PAROXYSMAL ATRIAL FIBRILLATION (HCC): Primary | ICD-10-CM

## 2022-03-17 DIAGNOSIS — I10 PRIMARY HYPERTENSION: ICD-10-CM

## 2022-03-17 PROCEDURE — 1090F PRES/ABSN URINE INCON ASSESS: CPT | Performed by: NUCLEAR MEDICINE

## 2022-03-17 PROCEDURE — 1123F ACP DISCUSS/DSCN MKR DOCD: CPT | Performed by: NUCLEAR MEDICINE

## 2022-03-17 PROCEDURE — 99204 OFFICE O/P NEW MOD 45 MIN: CPT | Performed by: NUCLEAR MEDICINE

## 2022-03-17 PROCEDURE — 4040F PNEUMOC VAC/ADMIN/RCVD: CPT | Performed by: NUCLEAR MEDICINE

## 2022-03-17 PROCEDURE — G8417 CALC BMI ABV UP PARAM F/U: HCPCS | Performed by: NUCLEAR MEDICINE

## 2022-03-17 PROCEDURE — 3017F COLORECTAL CA SCREEN DOC REV: CPT | Performed by: NUCLEAR MEDICINE

## 2022-03-17 PROCEDURE — 1036F TOBACCO NON-USER: CPT | Performed by: NUCLEAR MEDICINE

## 2022-03-17 PROCEDURE — G8484 FLU IMMUNIZE NO ADMIN: HCPCS | Performed by: NUCLEAR MEDICINE

## 2022-03-17 PROCEDURE — G8427 DOCREV CUR MEDS BY ELIG CLIN: HCPCS | Performed by: NUCLEAR MEDICINE

## 2022-03-17 PROCEDURE — G8400 PT W/DXA NO RESULTS DOC: HCPCS | Performed by: NUCLEAR MEDICINE

## 2022-03-17 ASSESSMENT — ENCOUNTER SYMPTOMS
PHOTOPHOBIA: 0
BLOOD IN STOOL: 0
NAUSEA: 0
COLOR CHANGE: 0
SHORTNESS OF BREATH: 1
BACK PAIN: 0
CHEST TIGHTNESS: 0
ABDOMINAL PAIN: 0
RECTAL PAIN: 0
CONSTIPATION: 0
DIARRHEA: 0
ABDOMINAL DISTENTION: 0
ANAL BLEEDING: 0
VOMITING: 0

## 2022-03-17 NOTE — PROGRESS NOTES
[id number]  [address]  [facility] [place] [address]  Dept: [phone]  Dept Fax: [phone]  Loc: [phone]    Visit Date: 3/17/2022    Callie Ferrer is a 67 y.o. female who presents todayfor:  Chief Complaint   Patient presents with    Check-Up    Atrial Fibrillation    Hypertension   here for the first time  Admitted for COVID   Then had A fib there  Event monitor confirmed intermittent A fib   No known A fib before  She is THAI vasc 3  BP higher today   Better at home  no known CAD before  No palpitation   No chest pain   Some dyspnea yet   Dyspnea on exertion  No known DM   No smoking   Family history for CAD         HPI:  HPI  Past Medical History:   Diagnosis Date    Aortic regurgitation     Diverticulosis of colon     Gastritis     GERD (gastroesophageal reflux disease)     IBS (irritable bowel syndrome)     Seasonal allergies     TMJ (dislocation of temporomandibular joint)       Past Surgical History:   Procedure Laterality Date    APPENDECTOMY  1971     SECTION       SECTION      COLONOSCOPY      [place]    HYSTERECTOMY  1994    bso    MANDIBLE SURGERY       Family History   Problem Relation Age of Onset    Glaucoma Mother     Stroke Father     Cancer Father         colon     Social History     Tobacco Use    Smoking status: Never Smoker    Smokeless tobacco: Never Used   Substance Use Topics    Alcohol use: No      Current Outpatient Medications   Medication Sig Dispense Refill    apixaban starter pack (ELIQUIS DVT/PE STARTER PACK) 5 MG TBPK tablet Take 1 tablet by mouth See Admin Instructions 74 tablet 0    loratadine (CLARITIN) 10 MG tablet Take 10 mg by mouth daily as needed       benzonatate (TESSALON) 200 MG capsule Take 1 capsule by mouth 3 times daily as needed for Cough (Patient not taking: Reported on 3/17/2022) 30 capsule 0    ondansetron (ZOFRAN) 4 MG tablet Take 1 tablet by mouth 3 times daily as needed for Nausea or Vomiting (Patient not taking: Reported on 2/7/2022) 21 tablet 0    pyridoxine (B-6) 100 MG tablet Take 50 mg by mouth daily (Patient not taking: Reported on 1/24/2022)      vitamin D (CHOLECALCIFEROL) 1000 UNIT TABS tablet Take 1,000 Units by mouth daily (Patient not taking: Reported on 1/24/2022)      Multiple Vitamins-Minerals (THERAPEUTIC MULTIVITAMIN-MINERALS) tablet Take 1 tablet by mouth daily. (Patient not taking: Reported on 1/24/2022)      calcium carbonate (OSCAL) 500 MG TABS tablet Take 500 mg by mouth daily. (Patient not taking: Reported on 1/24/2022)       No current facility-administered medications for this visit. Allergies   Allergen Reactions    Pcn [Penicillins] Hives    Shellfish-Derived Products Diarrhea and Nausea And Vomiting     Health Maintenance   Topic Date Due    Hepatitis C screen  Never done    COVID-19 Vaccine (1) Never done    Breast cancer screen  Never done    Shingles Vaccine (1 of 2) Never done    DEXA (modify frequency per FRAX score)  Never done    Pneumococcal 65+ years Vaccine (1 of 1 - PPSV23) Never done    Colorectal Cancer Screen  01/01/2017    Lipid screen  02/06/2018    Flu vaccine (1) Never done   [employer] Visit (AWV)  09/22/2021    Depression Monitoring  01/24/2023    DTaP/Tdap/Td vaccine (2 - Td or Tdap) 09/12/2023    Hepatitis A vaccine  Aged Out    Hepatitis B vaccine  Aged Out    Hib vaccine  Aged Out    Meningococcal (ACWY) vaccine  Aged Out       Subjective:  Review of Systems   Constitutional: Positive for fatigue. HENT: Negative for ear pain and nosebleeds. Eyes: Negative for photophobia. Respiratory: Positive for shortness of breath. Negative for chest tightness. Cardiovascular: Positive for palpitations. Negative for chest pain.    Gastrointestinal: Negative for abdominal distention, abdominal pain, anal bleeding, blood in stool, constipation, diarrhea, nausea, rectal pain and vomiting. Endocrine: Negative for polyphagia. Genitourinary: Negative for dysuria, frequency and urgency. Musculoskeletal: Negative for arthralgias, back pain, gait problem, joint swelling, myalgias, neck pain and neck stiffness. Skin: Negative for color change, pallor, rash and wound. Allergic/Immunologic: Negative for food allergies. Neurological: Negative for dizziness, syncope and light-headedness. Psychiatric/Behavioral: Negative for confusion, decreased concentration, dysphoric mood, hallucinations and self-injury. The patient is not nervous/anxious and is not hyperactive. Objective:  Physical Exam  Constitutional:       Appearance: She is normal weight. HENT:      Head: Normocephalic. Right Ear: Tympanic membrane normal.      Nose: Nose normal.      Mouth/Throat:      Mouth: Mucous membranes are moist.   Eyes:      Pupils: Pupils are equal, round, and reactive to light. Cardiovascular:      Rate and Rhythm: Normal rate and regular rhythm. Heart sounds: Murmur heard. No gallop. Pulmonary:      Effort: No respiratory distress. Breath sounds: No stridor. No wheezing, rhonchi or rales. Chest:      Chest wall: No tenderness. Abdominal:      General: There is no distension. Palpations: There is no mass. Tenderness: There is no abdominal tenderness. There is no right CVA tenderness, guarding or rebound. Hernia: No hernia is present. Musculoskeletal:         General: No swelling, tenderness, deformity or signs of injury. Cervical back: Normal range of motion. Right lower leg: No edema. Left lower leg: No edema. Skin:     Coloration: Skin is not jaundiced or pale. Findings: No bruising, erythema, lesion or rash. Neurological:      Mental Status: She is alert and oriented to person, place, and time. Cranial Nerves: No cranial nerve deficit. Sensory: No sensory deficit. Motor: No weakness. Coordination: Coordination normal.      Gait: Gait normal.      Deep Tendon Reflexes: Reflexes normal.   Psychiatric:         Mood and Affect: Mood normal.       BP (!) 162/80   Pulse 96   Ht 4' 11\" (1.499 m)   Wt 184 lb (83.5 kg)   BMI 37.16 kg/m²   As  Assessment:      Diagnosis Orders   1. Paroxysmal atrial fibrillation (HCC)     2. Primary hypertension     as above  PAF  THAI vasc 3  No known CAD  Recovering from COVID   Higher HR and BP today     Plan:  No follow-ups on file. As above  Discussed NOACs  Discussed adding beta blockers  She is reluctant   She had issues with it   Was supposed to be on metoprolol but didn't tolerate it   Continue risk factor modification and medical management  Thank you for allowing me to participate in the care of your patient. Please don't hesitate to contact me regarding any further issues related to the patient care      Orders Placed:  No orders of the defined types were placed in this encounter. Medications Prescribed:  No orders of the defined types were placed in this encounter. Discussed use, benefit, and side effects of prescribed medications. All patient questions answered. Pt voicedunderstanding. Instructed to continue current medications, diet and exercise. Continue risk factor modification and medical management. Patient agreed with treatment plan. Follow up as directed.     Electronically signedby Allen Chi MD on 3/17/2022 at 12:17 PM

## 2022-03-24 ENCOUNTER — OFFICE VISIT (OUTPATIENT)
Dept: FAMILY MEDICINE CLINIC | Age: 72
End: 2022-03-24

## 2022-03-24 VITALS
TEMPERATURE: 96.6 F | WEIGHT: 183 LBS | SYSTOLIC BLOOD PRESSURE: 142 MMHG | BODY MASS INDEX: 36.89 KG/M2 | HEIGHT: 59 IN | HEART RATE: 104 BPM | DIASTOLIC BLOOD PRESSURE: 84 MMHG | RESPIRATION RATE: 16 BRPM

## 2022-03-24 DIAGNOSIS — U07.1 UPPER RESPIRATORY TRACT INFECTION DUE TO COVID-19 VIRUS: ICD-10-CM

## 2022-03-24 DIAGNOSIS — R11.0 NAUSEA: ICD-10-CM

## 2022-03-24 DIAGNOSIS — Z86.16 HISTORY OF COVID-19: ICD-10-CM

## 2022-03-24 DIAGNOSIS — I48.0 PAROXYSMAL ATRIAL FIBRILLATION (HCC): Primary | ICD-10-CM

## 2022-03-24 DIAGNOSIS — A09 DIARRHEA OF INFECTIOUS ORIGIN: ICD-10-CM

## 2022-03-24 DIAGNOSIS — J06.9 UPPER RESPIRATORY TRACT INFECTION DUE TO COVID-19 VIRUS: ICD-10-CM

## 2022-03-24 DIAGNOSIS — I82.433 ACUTE DEEP VEIN THROMBOSIS (DVT) OF POPLITEAL VEIN OF BOTH LOWER EXTREMITIES (HCC): ICD-10-CM

## 2022-03-24 DIAGNOSIS — Z00.00 MEDICARE ANNUAL WELLNESS VISIT, SUBSEQUENT: ICD-10-CM

## 2022-03-24 DIAGNOSIS — K29.00 ACUTE GASTRITIS WITHOUT HEMORRHAGE, UNSPECIFIED GASTRITIS TYPE: ICD-10-CM

## 2022-03-24 PROCEDURE — 1090F PRES/ABSN URINE INCON ASSESS: CPT | Performed by: FAMILY MEDICINE

## 2022-03-24 PROCEDURE — 1036F TOBACCO NON-USER: CPT | Performed by: FAMILY MEDICINE

## 2022-03-24 PROCEDURE — 3017F COLORECTAL CA SCREEN DOC REV: CPT | Performed by: FAMILY MEDICINE

## 2022-03-24 PROCEDURE — 1123F ACP DISCUSS/DSCN MKR DOCD: CPT | Performed by: FAMILY MEDICINE

## 2022-03-24 PROCEDURE — G0439 PPPS, SUBSEQ VISIT: HCPCS | Performed by: FAMILY MEDICINE

## 2022-03-24 PROCEDURE — G8427 DOCREV CUR MEDS BY ELIG CLIN: HCPCS | Performed by: FAMILY MEDICINE

## 2022-03-24 PROCEDURE — 99213 OFFICE O/P EST LOW 20 MIN: CPT | Performed by: FAMILY MEDICINE

## 2022-03-24 PROCEDURE — G8417 CALC BMI ABV UP PARAM F/U: HCPCS | Performed by: FAMILY MEDICINE

## 2022-03-24 PROCEDURE — G8400 PT W/DXA NO RESULTS DOC: HCPCS | Performed by: FAMILY MEDICINE

## 2022-03-24 RX ORDER — ONDANSETRON 4 MG/1
4 TABLET, FILM COATED ORAL 3 TIMES DAILY PRN
Qty: 21 TABLET | Refills: 1 | Status: SHIPPED | OUTPATIENT
Start: 2022-03-24

## 2022-03-24 RX ORDER — FAMOTIDINE 20 MG/1
20 TABLET, FILM COATED ORAL 2 TIMES DAILY
Qty: 60 TABLET | Refills: 3 | Status: SHIPPED | OUTPATIENT
Start: 2022-03-24 | End: 2022-10-26

## 2022-03-24 ASSESSMENT — PATIENT HEALTH QUESTIONNAIRE - PHQ9
SUM OF ALL RESPONSES TO PHQ9 QUESTIONS 1 & 2: 0
SUM OF ALL RESPONSES TO PHQ QUESTIONS 1-9: 0
SUM OF ALL RESPONSES TO PHQ QUESTIONS 1-9: 0
2. FEELING DOWN, DEPRESSED OR HOPELESS: 0
SUM OF ALL RESPONSES TO PHQ QUESTIONS 1-9: 0
SUM OF ALL RESPONSES TO PHQ QUESTIONS 1-9: 0
1. LITTLE INTEREST OR PLEASURE IN DOING THINGS: 0

## 2022-03-24 ASSESSMENT — ENCOUNTER SYMPTOMS
WHEEZING: 0
COUGH: 0
SORE THROAT: 0
ABDOMINAL PAIN: 0
CHEST TIGHTNESS: 0
SHORTNESS OF BREATH: 0
CONSTIPATION: 0
EYE PAIN: 0
NAUSEA: 0

## 2022-03-24 ASSESSMENT — LIFESTYLE VARIABLES
HOW OFTEN DO YOU HAVE A DRINK CONTAINING ALCOHOL: MONTHLY OR LESS
HOW MANY STANDARD DRINKS CONTAINING ALCOHOL DO YOU HAVE ON A TYPICAL DAY: 1 OR 2

## 2022-03-24 NOTE — PATIENT INSTRUCTIONS
Personalized Preventive Plan for Jayne Luque - 3/24/2022  Medicare offers a range of preventive health benefits. Some of the tests and screenings are paid in full while other may be subject to a deductible, co-insurance, and/or copay. Some of these benefits include a comprehensive review of your medical history including lifestyle, illnesses that may run in your family, and various assessments and screenings as appropriate. After reviewing your medical record and screening and assessments performed today your provider may have ordered immunizations, labs, imaging, and/or referrals for you. A list of these orders (if applicable) as well as your Preventive Care list are included within your After Visit Summary for your review. Other Preventive Recommendations:    · A preventive eye exam performed by an eye specialist is recommended every 1-2 years to screen for glaucoma; cataracts, macular degeneration, and other eye disorders. · A preventive dental visit is recommended every 6 months. · Try to get at least 150 minutes of exercise per week or 10,000 steps per day on a pedometer . · Order or download the FREE \"Exercise & Physical Activity: Your Everyday Guide\" from [employer]. Call 1-473.402.9665 or search [employer] online. · You need 5007-7126 mg of calcium and 8026-6078 IU of vitamin D per day. It is possible to meet your calcium requirement with diet alone, but a vitamin D supplement is usually necessary to meet this goal.  · When exposed to the sun, use a sunscreen that protects against both UVA and UVB radiation with an SPF of 30 or greater. Reapply every 2 to 3 hours or after sweating, drying off with a towel, or swimming. · Always wear a seat belt when traveling in a car. Always wear a helmet when riding a bicycle or motorcycle.

## 2022-03-24 NOTE — PROGRESS NOTES
Medicare Annual Wellness Visit    Kale Pena is here for Medicare AWV    Assessment & Plan   Nausea  The following orders have not been finalized:  -     ondansetron (ZOFRAN) 4 MG tablet  Diarrhea of infectious origin  The following orders have not been finalized:  -     ondansetron (ZOFRAN) 4 MG tablet  Upper respiratory tract infection due to COVID-19 virus  The following orders have not been finalized:  -     ondansetron (ZOFRAN) 4 MG tablet      Recommendations for Preventive Services Due: see orders and patient instructions/AVS.  Recommended screening schedule for the next 5-10 years is provided to the patient in written form: see Patient Instructions/AVS.     No follow-ups on file. Subjective          Patient's complete Health Risk Assessment and screening values have been reviewed and are found in Flowsheets. The following problems were reviewed today and where indicated follow up appointments were made and/or referrals ordered.     Positive Risk Factor Screenings with Interventions:             General Health and ACP:  General  In general, how would you say your health is?: Good  In the past 7 days, have you experienced any of the following: New or Increased Pain, New or Increased Fatigue, Loneliness, Social Isolation, Stress or Anger?: No  Do you get the social and emotional support that you need?: Yes  Do you have a Living Will?: Yes    Advance Directives     Power of  Living Will ACP-Advance Directive ACP-Power of     Not on File Not on File Not on File Not on File      General Health Risk Interventions:  ·   pwoer  of  health  note     daughters    Health Habits/Nutrition:     Physical Activity: Inactive    Days of Exercise per Week: 0 days    Minutes of Exercise per Session: 0 min     Have you lost any weight without trying in the past 3 months?: No  Body mass index: (!) 36.96  Have you seen the dentist within the past year?: Yes    Health Habits/Nutrition Interventions:  · weight  loss  trying      Safety:  Do you have working smoke detectors?: Yes  Do you have any tripping hazards - loose or unsecured carpets or rugs?: No  Do you have any tripping hazards - clutter in doorways, halls, or stairs?: No  Do you have either shower bars, grab bars, non-slip mats or non-slip surfaces in your shower or bathtub?: (!) No  Do all of your stairways have a railing or banister?: Yes  Do you always fasten your seatbelt when you are in a car?: Yes    Safety Interventions:  · Home safety tips provided           Objective   Vitals:    03/24/22 0908 03/24/22 0934   BP: (!) 146/82 (!) 142/84   Site: Right Upper Arm Right Upper Arm   Position: Sitting Sitting   Cuff Size: Medium Adult Medium Adult   Pulse: 104    Resp: 16    Temp: 96.6 °F (35.9 °C)    TempSrc: Infrared    Weight: 183 lb (83 kg)    Height: 4' 11\" (1.499 m)       Body mass index is 36.96 kg/m². Allergies   Allergen Reactions    Pcn [Penicillins] Hives    Shellfish-Derived Products Diarrhea and Nausea And Vomiting     Prior to Visit Medications    Medication Sig Taking? Authorizing Provider   apixaban (ELIQUIS) 5 MG TABS tablet Take 5 mg by mouth 2 times daily Yes Historical Provider, MD   ondansetron (ZOFRAN) 4 MG tablet Take 1 tablet by mouth 3 times daily as needed for Nausea or Vomiting Yes Juan Sequeira MD   loratadine (CLARITIN) 10 MG tablet Take 10 mg by mouth daily as needed  Yes Historical Provider, MD   pyridoxine (B-6) 100 MG tablet Take 50 mg by mouth daily  Patient not taking: Reported on 1/24/2022  Historical Provider, MD   vitamin D (CHOLECALCIFEROL) 1000 UNIT TABS tablet Take 1,000 Units by mouth daily  Patient not taking: Reported on 1/24/2022  Historical Provider, MD   Multiple Vitamins-Minerals (THERAPEUTIC MULTIVITAMIN-MINERALS) tablet Take 1 tablet by mouth daily.   Patient not taking: Reported on 1/24/2022  Historical Provider, MD   calcium carbonate (OSCAL) 500 MG TABS tablet Take 500 mg by mouth daily. Patient not taking: Reported on 3/24/2022  Historical Provider, MD Nazario (Including outside providers/suppliers regularly involved in providing care):   Patient Care Team:  Izabela Ashley MD as PCP - General (Family Medicine)  Izabela Ashley MD as PCP - [facility] Empaneled Provider  Wendy Hanson MD as Cardiologist (Cardiology)    Reviewed and updated this visit:  Tobacco  Allergies  Meds  Med Hx  Surg Hx  Soc Hx  Fam Hx                        Shahram Hanson (:  1950) is a 67 y.o. female,Established patient, here for evaluation of the following chief complaint(s):  Medicare AWV         ASSESSMENT/     Diagnosis Orders   1. Paroxysmal atrial fibrillation (HCC)  apixaban (ELIQUIS) 5 MG TABS tablet    dilTIAZem (CARDIZEM) 30 MG tablet   2. Nausea  ondansetron (ZOFRAN) 4 MG tablet   3. Diarrhea of infectious origin  ondansetron (ZOFRAN) 4 MG tablet   4. Upper respiratory tract infection due to COVID-19 virus  ondansetron (ZOFRAN) 4 MG tablet   5. Acute gastritis without hemorrhage, unspecified gastritis type  famotidine (PEPCID) 20 MG tablet   6. History of COVID-19     7.  Acute deep vein thrombosis (DVT) of popliteal vein of both lower extremities (HCC)         PLAN:  Current Outpatient Medications   Medication Sig Dispense Refill    ondansetron (ZOFRAN) 4 MG tablet Take 1 tablet by mouth 3 times daily as needed for Nausea or Vomiting 21 tablet 1    apixaban (ELIQUIS) 5 MG TABS tablet Take 1 tablet by mouth 2 times daily 180 tablet 1    famotidine (PEPCID) 20 MG tablet Take 1 tablet by mouth 2 times daily 60 tablet 3    dilTIAZem (CARDIZEM) 30 MG tablet Take 1 tablet by mouth 4 times daily 120 tablet 3    loratadine (CLARITIN) 10 MG tablet Take 10 mg by mouth daily as needed       pyridoxine (B-6) 100 MG tablet Take 50 mg by mouth daily (Patient not taking: Reported on 2022)      vitamin D (CHOLECALCIFEROL) 1000 UNIT TABS tablet Take 1,000 Units by mouth daily Exam  Vitals and nursing note reviewed. Constitutional:       Appearance: She is well-developed. HENT:      Head: Normocephalic and atraumatic. Right Ear: External ear normal.      Left Ear: External ear normal.      Nose: Nose normal.   Eyes:      General: No scleral icterus. Conjunctiva/sclera: Conjunctivae normal.      Pupils: Pupils are equal, round, and reactive to light. Neck:      Thyroid: No thyromegaly. Vascular: No JVD. Cardiovascular:      Rate and Rhythm: Normal rate and regular rhythm. Heart sounds: Normal heart sounds. Pulmonary:      Effort: Pulmonary effort is normal.      Breath sounds: Normal breath sounds. No wheezing or rales. Abdominal:      General: Bowel sounds are normal. There is no distension. Palpations: Abdomen is soft. There is no mass. Tenderness: There is abdominal tenderness (epigastric  pain). Musculoskeletal:         General: No tenderness. Normal range of motion. Cervical back: Normal range of motion and neck supple. Comments:   No l;eg  pain   Lymphadenopathy:      Cervical: No cervical adenopathy. Skin:     General: Skin is warm and dry. Findings: No rash. Neurological:      Mental Status: She is alert and oriented to person, place, and time. Cranial Nerves: No cranial nerve deficit. Deep Tendon Reflexes: Reflexes are normal and symmetric. An electronic signature was used to authenticate this note.     --Lamar Webb MD

## 2022-04-20 ENCOUNTER — HOSPITAL ENCOUNTER (EMERGENCY)
Age: 72
Discharge: HOME | End: 2022-04-20
Attending: EMERGENCY MEDICINE
Payer: MEDICARE

## 2022-04-20 ENCOUNTER — APPOINTMENT (OUTPATIENT)
Dept: GENERAL RADIOLOGY | Age: 72
End: 2022-04-20
Payer: MEDICARE

## 2022-04-20 VITALS
OXYGEN SATURATION: 97 % | WEIGHT: 185 LBS | HEIGHT: 59 IN | HEART RATE: 91 BPM | BODY MASS INDEX: 37.29 KG/M2 | DIASTOLIC BLOOD PRESSURE: 71 MMHG | RESPIRATION RATE: 18 BRPM | SYSTOLIC BLOOD PRESSURE: 175 MMHG | TEMPERATURE: 97.2 F

## 2022-04-20 DIAGNOSIS — R07.89 ATYPICAL CHEST PAIN: Primary | ICD-10-CM

## 2022-04-20 LAB
ANION GAP SERPL CALCULATED.3IONS-SCNC: 10 MEQ/L (ref 8–16)
BASOPHILS # BLD: 0.4 %
BASOPHILS ABSOLUTE: 0 THOU/MM3 (ref 0–0.1)
BUN BLDV-MCNC: 17 MG/DL (ref 7–22)
CALCIUM SERPL-MCNC: 9 MG/DL (ref 8.5–10.5)
CHLORIDE BLD-SCNC: 103 MEQ/L (ref 98–111)
CO2: 24 MEQ/L (ref 23–33)
CREAT SERPL-MCNC: 1 MG/DL (ref 0.4–1.2)
EOSINOPHIL # BLD: 3.2 %
EOSINOPHILS ABSOLUTE: 0.2 THOU/MM3 (ref 0–0.4)
ERYTHROCYTE [DISTWIDTH] IN BLOOD BY AUTOMATED COUNT: 13.9 % (ref 11.5–14.5)
ERYTHROCYTE [DISTWIDTH] IN BLOOD BY AUTOMATED COUNT: 44.5 FL (ref 35–45)
GFR SERPL CREATININE-BSD FRML MDRD: 54 ML/MIN/1.73M2
GLUCOSE BLD-MCNC: 107 MG/DL (ref 70–108)
HCT VFR BLD CALC: 39.4 % (ref 37–47)
HEMOGLOBIN: 13 GM/DL (ref 12–16)
IMMATURE GRANS (ABS): 0.02 THOU/MM3 (ref 0–0.07)
IMMATURE GRANULOCYTES: 0.3 %
LYMPHOCYTES # BLD: 36.7 %
LYMPHOCYTES ABSOLUTE: 2.5 THOU/MM3 (ref 1–4.8)
MCH RBC QN AUTO: 29.1 PG (ref 26–33)
MCHC RBC AUTO-ENTMCNC: 33 GM/DL (ref 32.2–35.5)
MCV RBC AUTO: 88.3 FL (ref 81–99)
MONOCYTES # BLD: 8.1 %
MONOCYTES ABSOLUTE: 0.6 THOU/MM3 (ref 0.4–1.3)
NUCLEATED RED BLOOD CELLS: 0 /100 WBC
OSMOLALITY CALCULATION: 275.8 MOSMOL/KG (ref 275–300)
PLATELET # BLD: 262 THOU/MM3 (ref 130–400)
PMV BLD AUTO: 9.6 FL (ref 9.4–12.4)
POTASSIUM REFLEX MAGNESIUM: 4.4 MEQ/L (ref 3.5–5.2)
PRO-BNP: 202.7 PG/ML (ref 0–900)
RBC # BLD: 4.46 MILL/MM3 (ref 4.2–5.4)
SEG NEUTROPHILS: 51.3 %
SEGMENTED NEUTROPHILS ABSOLUTE COUNT: 3.5 THOU/MM3 (ref 1.8–7.7)
SODIUM BLD-SCNC: 137 MEQ/L (ref 135–145)
TROPONIN T: < 0.01 NG/ML
WBC # BLD: 6.8 THOU/MM3 (ref 4.8–10.8)

## 2022-04-20 PROCEDURE — 93005 ELECTROCARDIOGRAM TRACING: CPT | Performed by: EMERGENCY MEDICINE

## 2022-04-20 PROCEDURE — 85025 COMPLETE CBC W/AUTO DIFF WBC: CPT

## 2022-04-20 PROCEDURE — 84484 ASSAY OF TROPONIN QUANT: CPT

## 2022-04-20 PROCEDURE — 99285 EMERGENCY DEPT VISIT HI MDM: CPT

## 2022-04-20 PROCEDURE — 83880 ASSAY OF NATRIURETIC PEPTIDE: CPT

## 2022-04-20 PROCEDURE — 71045 X-RAY EXAM CHEST 1 VIEW: CPT

## 2022-04-20 PROCEDURE — 36415 COLL VENOUS BLD VENIPUNCTURE: CPT

## 2022-04-20 PROCEDURE — 80048 BASIC METABOLIC PNL TOTAL CA: CPT

## 2022-04-20 ASSESSMENT — PAIN DESCRIPTION - LOCATION: LOCATION: CHEST

## 2022-04-20 ASSESSMENT — PAIN DESCRIPTION - ORIENTATION: ORIENTATION: LEFT

## 2022-04-20 ASSESSMENT — PAIN DESCRIPTION - PAIN TYPE: TYPE: ACUTE PAIN

## 2022-04-20 ASSESSMENT — PAIN - FUNCTIONAL ASSESSMENT: PAIN_FUNCTIONAL_ASSESSMENT: 0-10

## 2022-04-20 ASSESSMENT — PAIN SCALES - GENERAL: PAINLEVEL_OUTOF10: 4

## 2022-04-21 LAB
EKG ATRIAL RATE: 91 BPM
EKG P AXIS: 15 DEGREES
EKG P-R INTERVAL: 186 MS
EKG Q-T INTERVAL: 344 MS
EKG QRS DURATION: 66 MS
EKG QTC CALCULATION (BAZETT): 423 MS
EKG R AXIS: 99 DEGREES
EKG T AXIS: 38 DEGREES
EKG VENTRICULAR RATE: 91 BPM

## 2022-04-21 NOTE — ED NOTES
Pt to ER from home due to chest pain. Pt states that she recently was diagnosed with afib and yesterday she had a couple episodes of her heart racing. Pt states it then went away, but today she started having chest pain around 1200 in the left chest area. Pt states the pain doesn't radiate, but the intensity has worsened this evening.  She is currently rating pain 4/10     Francie Pastrana RN  04/20/22 2582

## 2022-04-21 NOTE — ED PROVIDER NOTES
[facility] EMERGENCY DEPT      CHIEF COMPLAINT       Chief Complaint   Patient presents with    Chest Pain       Nurses Notes reviewed and I agree except as noted in the HPI. HISTORY OF PRESENT ILLNESS    Newton Anguiano is a 67 y.o. female who presents  with complaint of sharp intermittent left chest pain, that she has had since noon today. States that she has no history of CAD, however, she has history of paroxysmal A. fib for which she is taking Eliquis as prescribed and Cardizem for rate control. Pain is not made worse with exertion. She denies any coughs, fevers or chills. Onset: Acute  Duration: Approximately 10 hours  Timing: Intermittent  Location of Pain: Left chest/breast  Intesity/severity: Mild  Modifying Factors: None  Relieved by;  Previous Episodes; Tx Before arrival: None  REVIEW OF SYSTEMS      Review of Systems   Constitutional: Negative for fever, chills, diaphoresis and fatigue. HENT: Negative for congestion, drooling, facial swelling and sore throat. Eyes: Negative for photophobia, pain and discharge. Respiratory: Negative for cough, shortness of breath, wheezing and stridor. Cardiovascular: Positive for chest pain, palpitations. Gastrointestinal: Negative for abdominal pain, blood in stool and abdominal distention. Genitourinary: Negative for dysuria, urgency, hematuria and difficulty urinating. Musculoskeletal: Negative for gait problem, neck pain and neck stiffness. Skin; No rash, No itching  Neurological: Negative for seizures, weakness and numbness. Psychiatric/Behavioral: Negative for hallucinations, confusion and agitation. PAST MEDICAL HISTORY    has a past medical history of Aortic regurgitation, Atrial fibrillation ([address] Diverticulosis of colon, Gastritis, GERD (gastroesophageal reflux disease), IBS (irritable bowel syndrome), Seasonal allergies, and TMJ (dislocation of temporomandibular joint).     SURGICAL HISTORY      has a past surgical history that includes Appendectomy ();  section ();  section (); Mandible surgery; Hysterectomy (1994); and Colonoscopy (). CURRENT MEDICATIONS       Discharge Medication List as of 2022  9:37 PM      CONTINUE these medications which have NOT CHANGED    Details   ondansetron (ZOFRAN) 4 MG tablet Take 1 tablet by mouth 3 times daily as needed for Nausea or Vomiting, Disp-21 tablet, R-1Normal      apixaban (ELIQUIS) 5 MG TABS tablet Take 1 tablet by mouth 2 times daily, Disp-180 tablet, R-1Normal      famotidine (PEPCID) 20 MG tablet Take 1 tablet by mouth 2 times daily, Disp-60 tablet, R-3Normal      dilTIAZem (CARDIZEM) 30 MG tablet Take 1 tablet by mouth 4 times daily, Disp-120 tablet, R-3Normal      pyridoxine (B-6) 100 MG tablet Take 50 mg by mouth dailyHistorical Med      vitamin D (CHOLECALCIFEROL) 1000 UNIT TABS tablet Take 1,000 Units by mouth dailyHistorical Med      Multiple Vitamins-Minerals (THERAPEUTIC MULTIVITAMIN-MINERALS) tablet Take 1 tablet by mouth daily. calcium carbonate (OSCAL) 500 MG TABS tablet Take 500 mg by mouth daily. loratadine (CLARITIN) 10 MG tablet Take 10 mg by mouth daily as needed Historical Med             ALLERGIES     is allergic to pcn [penicillins] and shellfish-derived products. FAMILY HISTORY     She indicated that her mother is alive. She indicated that her father is . family history includes Cancer in her father; Glaucoma in her mother; Stroke in her father. SOCIAL HISTORY      reports that she has never smoked. She has never used smokeless tobacco. She reports that she does not drink alcohol and does not use drugs. PHYSICAL EXAM     INITIAL VITALS:  height is 4' 11\" (1.499 m) and weight is 185 lb (83.9 kg). Her oral temperature is 97.2 °F (36.2 °C). Her blood pressure is 175/71 (abnormal) and her pulse is 91. Her respiration is 18 and oxygen saturation is 97%.     Physical Exam   Constitutional:  well-developed and well-nourished. HENT: Head: Normocephalic, atraumatic, Bilateral external ears normal, Oropharynx mosit, No oral exudates, Nose normal.   Eyes: PERRL, EOMI, Conjunctiva normal, No discharge. No scleral icterus  Neck: Normal range of motion, No tenderness, Supple  Cardiovascular: Normal rate, regular rhythm, S1 normal and S2 normal.  Exam reveals no gallop. Pulmonary/Chest: Effort normal and breath sounds normal. No accessory muscle usage or stridor. No respiratory distress. no wheezes. has no rales. exhibits no tenderness. Abdominal: Soft. Bowel sounds are normal.  exhibits no distension. There is no tenderness. There is no rebound and no guarding. Extremities: No edema, no tenderness, no cyanosis, no clubbing. Neurological: Alert and oriented ×3, normal motor function, normal sensory function, no focal deficits. GCS 15. Skin: Skin is warm, dry and intact. No rash noted. No erythema. Psychiatric: Affect normal, judgment normal, mood normal.  DIFFERENTIAL DIAGNOSIS:       DIAGNOSTIC RESULTS     EKG: All EKG's are interpreted by the Emergency Department Physician who either signs or Co-signs this chart in the absence of a cardiologist.      RADIOLOGY: non-plain film images(s) such as CT, Ultrasound and MRI are read by the radiologist.  Plain radiographic images are visualized and preliminarily interpreted by the emergency physician unless otherwise stated below.       LABS:   Labs Reviewed   GLOMERULAR FILTRATION RATE, ESTIMATED - Abnormal; Notable for the following components:       Result Value    Est, Glom Filt Rate 54 (*)     All other components within normal limits   CBC WITH AUTO DIFFERENTIAL   BASIC METABOLIC PANEL W/ REFLEX TO MG FOR LOW K   TROPONIN   BRAIN NATRIURETIC PEPTIDE   ANION GAP   OSMOLALITY       EMERGENCY DEPARTMENT COURSE:   Vitals:    Vitals:    04/20/22 2029   BP: (!) 175/71   Pulse: 91   Resp: 18   Temp: 97.2 °F (36.2 °C)   TempSrc: Oral   SpO2: 97%   Weight: 185 lb (83.9 kg) Height: 4' 11\" (1.499 m)         CRITICAL CARE:       CONSULTS:  None    PROCEDURES:  None    FINAL IMPRESSION      1.  Atypical chest pain          DISPOSITION/PLAN   Decision To Discharge    PATIENT REFERRED TO:  Juany Fulton MD  [place] [id number] [phone]    Schedule an appointment as soon as possible for a visit in 1 day  RE-CHECK AND FURTHER TESTING AS NEEDED      DISCHARGE MEDICATIONS:  Discharge Medication List as of 4/20/2022  9:37 PM          (Please note that portions of this note were completed with a voice recognition program.  Efforts were made to edit the dictations but occasionally words are mis-transcribed.)    January Lake, [address] [place] [address],   04/20/22 1615

## 2022-07-12 ENCOUNTER — OFFICE VISIT (OUTPATIENT)
Dept: FAMILY MEDICINE CLINIC | Age: 72
End: 2022-07-12

## 2022-07-12 VITALS
HEART RATE: 80 BPM | BODY MASS INDEX: 38.56 KG/M2 | WEIGHT: 191.25 LBS | SYSTOLIC BLOOD PRESSURE: 134 MMHG | RESPIRATION RATE: 14 BRPM | HEIGHT: 59 IN | DIASTOLIC BLOOD PRESSURE: 86 MMHG

## 2022-07-12 DIAGNOSIS — N18.31 STAGE 3A CHRONIC KIDNEY DISEASE (HCC): ICD-10-CM

## 2022-07-12 DIAGNOSIS — K21.9 GASTROESOPHAGEAL REFLUX DISEASE WITHOUT ESOPHAGITIS: ICD-10-CM

## 2022-07-12 DIAGNOSIS — I48.0 PAROXYSMAL ATRIAL FIBRILLATION (HCC): Primary | ICD-10-CM

## 2022-07-12 DIAGNOSIS — Z86.16 HISTORY OF COVID-19: ICD-10-CM

## 2022-07-12 PROBLEM — N18.30 CHRONIC RENAL DISEASE, STAGE III (HCC): Status: ACTIVE | Noted: 2022-07-12

## 2022-07-12 PROCEDURE — 1123F ACP DISCUSS/DSCN MKR DOCD: CPT | Performed by: FAMILY MEDICINE

## 2022-07-12 PROCEDURE — 99213 OFFICE O/P EST LOW 20 MIN: CPT | Performed by: FAMILY MEDICINE

## 2022-07-12 PROCEDURE — G8427 DOCREV CUR MEDS BY ELIG CLIN: HCPCS | Performed by: FAMILY MEDICINE

## 2022-07-12 PROCEDURE — G8417 CALC BMI ABV UP PARAM F/U: HCPCS | Performed by: FAMILY MEDICINE

## 2022-07-12 PROCEDURE — 3017F COLORECTAL CA SCREEN DOC REV: CPT | Performed by: FAMILY MEDICINE

## 2022-07-12 PROCEDURE — G8400 PT W/DXA NO RESULTS DOC: HCPCS | Performed by: FAMILY MEDICINE

## 2022-07-12 PROCEDURE — 1090F PRES/ABSN URINE INCON ASSESS: CPT | Performed by: FAMILY MEDICINE

## 2022-07-12 PROCEDURE — 1036F TOBACCO NON-USER: CPT | Performed by: FAMILY MEDICINE

## 2022-07-12 RX ORDER — METOPROLOL SUCCINATE 25 MG/1
TABLET, EXTENDED RELEASE ORAL
Qty: 30 TABLET | Refills: 3 | Status: SHIPPED | OUTPATIENT
Start: 2022-07-12

## 2022-07-12 ASSESSMENT — ENCOUNTER SYMPTOMS
WHEEZING: 0
NAUSEA: 0
EYE PAIN: 0
CHEST TIGHTNESS: 0
COUGH: 0
ABDOMINAL PAIN: 0
CONSTIPATION: 0
SHORTNESS OF BREATH: 0
SORE THROAT: 0

## 2022-07-12 NOTE — PROGRESS NOTES
Subjective:      Patient ID: Usha Lovelace is a 67 y.o. female. Fatigue  Noted    Post  covid        gerd  Stab;le ,      Atrial  Fib  Noted and   pounding  At  Times       covid  Noted and  With  Memory  Noted     Past Medical History:   Diagnosis Date    Aortic regurgitation     Atrial fibrillation (HCC)     Diverticulosis of colon     Gastritis     GERD (gastroesophageal reflux disease)     IBS (irritable bowel syndrome)     Seasonal allergies     TMJ (dislocation of temporomandibular joint)      Review of Systems   Constitutional: Negative for appetite change, fatigue and fever. HENT: Negative for congestion, ear pain, postnasal drip and sore throat. Eyes: Negative for pain and visual disturbance. Respiratory: Negative for cough, chest tightness, shortness of breath and wheezing. Cardiovascular: Negative for chest pain, palpitations and leg swelling. Gastrointestinal: Negative for abdominal pain, constipation and nausea. Genitourinary: Negative for dysuria and frequency. Musculoskeletal: Negative for arthralgias, joint swelling, neck pain and neck stiffness. Skin: Negative for rash. Neurological: Negative for dizziness, weakness, numbness and headaches. Hematological: Negative for adenopathy. Does not bruise/bleed easily. Psychiatric/Behavioral: Negative for behavioral problems and sleep disturbance. The patient is not nervous/anxious. /86 (Site: Left Upper Arm, Position: Sitting, Cuff Size: Medium Adult)   Pulse 80   Resp 14   Ht 4' 11\" (1.499 m)   Wt 191 lb 4 oz (86.8 kg)   BMI 38.63 kg/m²   Objective:   Physical Exam  Vitals and nursing note reviewed. Constitutional:       Appearance: She is well-developed. HENT:      Head: Normocephalic and atraumatic. Right Ear: External ear normal.      Left Ear: External ear normal.      Nose: Nose normal.   Eyes:      General: No scleral icterus.      Conjunctiva/sclera: Conjunctivae normal.      Pupils: Pupils are equal, round, and reactive to light. Neck:      Thyroid: No thyromegaly. Vascular: No JVD. Cardiovascular:      Rate and Rhythm: Normal rate and regular rhythm. Heart sounds: Normal heart sounds. Pulmonary:      Effort: Pulmonary effort is normal.      Breath sounds: Normal breath sounds. No wheezing or rales. Abdominal:      General: Bowel sounds are normal. There is no distension. Palpations: Abdomen is soft. There is no mass. Tenderness: There is no abdominal tenderness. Musculoskeletal:         General: No tenderness. Normal range of motion. Cervical back: Normal range of motion and neck supple. Lymphadenopathy:      Cervical: No cervical adenopathy. Skin:     General: Skin is warm and dry. Findings: No rash. Neurological:      Mental Status: She is alert and oriented to person, place, and time. Cranial Nerves: No cranial nerve deficit. Deep Tendon Reflexes: Reflexes are normal and symmetric. Assessment:       Diagnosis Orders   1. Paroxysmal atrial fibrillation (HCC)  metoprolol succinate (TOPROL XL) 25 MG extended release tablet   2. Gastroesophageal reflux disease without esophagitis     3. Stage 3a chronic kidney disease ([facility] [address]     4.  History of COVID-19           Plan:      Current Outpatient Medications   Medication Sig Dispense Refill    apixaban (ELIQUIS) 5 MG TABS tablet Take 1 tablet by mouth 2 times daily 180 tablet 1    pyridoxine (B-6) 100 MG tablet Take 50 mg by mouth daily       vitamin D (CHOLECALCIFEROL) 1000 UNIT TABS tablet Take 1,000 Units by mouth daily       Multiple Vitamins-Minerals (THERAPEUTIC MULTIVITAMIN-MINERALS) tablet Take 1 tablet by mouth daily       loratadine (CLARITIN) 10 MG tablet Take 10 mg by mouth daily as needed       metoprolol succinate (TOPROL XL) 25 MG extended release tablet 1/2  Tab po daily 30 tablet 3    ondansetron (ZOFRAN) 4 MG tablet Take 1 tablet by mouth 3 times daily as needed for Nausea or Vomiting (Patient not taking: Reported on 7/12/2022) 21 tablet 1    famotidine (PEPCID) 20 MG tablet Take 1 tablet by mouth 2 times daily (Patient not taking: Reported on 7/12/2022) 60 tablet 3    calcium carbonate (OSCAL) 500 MG TABS tablet Take 500 mg by mouth daily  (Patient not taking: Reported on 7/12/2022)       No current facility-administered medications for this visit.          Start  12.5  Mg  toprol xl       See in  5  mths         Melissa Moreno MD

## 2022-09-06 ENCOUNTER — TELEPHONE (OUTPATIENT)
Dept: CARDIOLOGY CLINIC | Age: 72
End: 2022-09-06

## 2022-09-06 NOTE — TELEPHONE ENCOUNTER
Lashawnantionette Naseem is calling to RS her appt with Pascual Martinez, she has an appt 10-06 with Pascual Martinez, which I canceled, since she cant come in that day. Israel Navarro her  has an appt 10-26 with Adalid at 1200. Caleb Gusman would like to RS her appt for 10-26 so her and her  can both come together, if 10-26 doesn't work for both of them to come together, they can both come a different day. Please call Caleb Gusman to RS her appt.

## 2022-09-06 NOTE — TELEPHONE ENCOUNTER
Patient rescheduled her husbands appointment to 11-9-22 due to time being 7:45 am.  Patient took husbands appointment time for 10-26-22 at 12 pm.

## 2022-10-26 ENCOUNTER — OFFICE VISIT (OUTPATIENT)
Dept: CARDIOLOGY CLINIC | Age: 72
End: 2022-10-26
Payer: MEDICARE

## 2022-10-26 VITALS
DIASTOLIC BLOOD PRESSURE: 90 MMHG | SYSTOLIC BLOOD PRESSURE: 150 MMHG | HEIGHT: 60 IN | HEART RATE: 80 BPM | BODY MASS INDEX: 38.21 KG/M2 | WEIGHT: 194.6 LBS

## 2022-10-26 DIAGNOSIS — I48.0 PAROXYSMAL ATRIAL FIBRILLATION (HCC): Primary | ICD-10-CM

## 2022-10-26 DIAGNOSIS — I10 PRIMARY HYPERTENSION: ICD-10-CM

## 2022-10-26 PROCEDURE — 3074F SYST BP LT 130 MM HG: CPT | Performed by: NUCLEAR MEDICINE

## 2022-10-26 PROCEDURE — 3078F DIAST BP <80 MM HG: CPT | Performed by: NUCLEAR MEDICINE

## 2022-10-26 PROCEDURE — 99213 OFFICE O/P EST LOW 20 MIN: CPT | Performed by: NUCLEAR MEDICINE

## 2022-10-26 PROCEDURE — 1090F PRES/ABSN URINE INCON ASSESS: CPT | Performed by: NUCLEAR MEDICINE

## 2022-10-26 PROCEDURE — G8427 DOCREV CUR MEDS BY ELIG CLIN: HCPCS | Performed by: NUCLEAR MEDICINE

## 2022-10-26 PROCEDURE — 1036F TOBACCO NON-USER: CPT | Performed by: NUCLEAR MEDICINE

## 2022-10-26 PROCEDURE — G8484 FLU IMMUNIZE NO ADMIN: HCPCS | Performed by: NUCLEAR MEDICINE

## 2022-10-26 PROCEDURE — 3017F COLORECTAL CA SCREEN DOC REV: CPT | Performed by: NUCLEAR MEDICINE

## 2022-10-26 PROCEDURE — G8400 PT W/DXA NO RESULTS DOC: HCPCS | Performed by: NUCLEAR MEDICINE

## 2022-10-26 PROCEDURE — G8417 CALC BMI ABV UP PARAM F/U: HCPCS | Performed by: NUCLEAR MEDICINE

## 2022-10-26 PROCEDURE — 1123F ACP DISCUSS/DSCN MKR DOCD: CPT | Performed by: NUCLEAR MEDICINE

## 2022-10-26 NOTE — PROGRESS NOTES
[id number] [address]  [address]  [facility] [place] [address]  Dept: [phone]  Dept Fax: [phone]  Loc: [phone]    Visit Date: 10/26/2022    Manish Hernandez is a 67 y.o. female who presents todayfor:  Chief Complaint   Patient presents with    Follow-up    Hypertension    Atrial Fibrillation   Had COVID   Had A fib   On medical Rx  No chest pain   Some baseline dyspnea  BP is stable   No dizziness  No syncope        HPI:  HPI  Past Medical History:   Diagnosis Date    Aortic regurgitation     Atrial fibrillation (HCC)     Diverticulosis of colon     Gastritis     GERD (gastroesophageal reflux disease)     IBS (irritable bowel syndrome)     Seasonal allergies     TMJ (dislocation of temporomandibular joint)       Past Surgical History:   Procedure Laterality Date    APPENDECTOMY  [address]    COLONOSCOPY  2007    [place]    HYSTERECTOMY (CERVIX STATUS UNKNOWN)  01/1994    [facility]    MANDIBLE SURGERY       Family History   Problem Relation Age of Onset    Glaucoma Mother     Stroke Father     Cancer Father         colon     Social History     Tobacco Use    Smoking status: Never    Smokeless tobacco: Never   Substance Use Topics    Alcohol use: No      Current Outpatient Medications   Medication Sig Dispense Refill    metoprolol succinate (TOPROL XL) 25 MG extended release tablet 1/2  Tab po daily 30 tablet 3    ondansetron (ZOFRAN) 4 MG tablet Take 1 tablet by mouth 3 times daily as needed for Nausea or Vomiting 21 tablet 1    apixaban (ELIQUIS) 5 MG TABS tablet Take 1 tablet by mouth 2 times daily 180 tablet 1    pyridoxine (B-6) 100 MG tablet Take 50 mg by mouth daily       vitamin D (CHOLECALCIFEROL) 1000 UNIT TABS tablet Take 1,000 Units by mouth daily       Multiple Vitamins-Minerals (THERAPEUTIC MULTIVITAMIN-MINERALS) tablet Take 1 tablet by mouth daily       calcium carbonate (OSCAL) 500 MG TABS tablet Take 500 mg by mouth daily      loratadine (CLARITIN) 10 MG tablet Take 10 mg by mouth daily as needed        No current facility-administered medications for this visit. Allergies   Allergen Reactions    Pcn [Penicillins] Hives    Shellfish-Derived Products Diarrhea and Nausea And Vomiting     Health Maintenance   Topic Date Due    COVID-19 Vaccine (1) Never done    Hepatitis C screen  Never done    Breast cancer screen  Never done    Shingles vaccine (1 of 2) Never done    DEXA (modify frequency per FRAX score)  Never done    Pneumococcal 65+ years Vaccine (1 - PCV) Never done    Colorectal Cancer Screen  01/01/2017    Lipids  02/06/2018    Flu vaccine (1) Never done    Depression Monitoring  03/24/2023    Annual Wellness Visit (AWV)  03/25/2023    DTaP/Tdap/Td vaccine (2 - Td or Tdap) 09/12/2023    Hepatitis A vaccine  Aged Out    Hib vaccine  Aged Out    Meningococcal (ACWY) vaccine  Aged Out       Subjective:  Review of Systems  General:   No fever, no chills, No fatigue or weight loss  Pulmonary:    No dyspnea, no wheezing  Cardiac:    Denies recent chest pain,   GI:     No nausea or vomiting, no abdominal pain  Neuro:    No dizziness or light headedness,   Musculoskeletal:  No recent active issues  Extremities:   No edema, no obvious claudication     Objective:  Physical Exam  BP (!) 150/90   Pulse 80   Ht 4' 11.5\" (1.511 m)   Wt 194 lb 9.6 oz (88.3 kg)   BMI 38.65 kg/m²   General:   Well developed, well nourished  Lungs:   Clear to auscultation  Heart:    Normal S1 S2, Slight murmur. no rubs, no gallops  Abdomen:   Soft, non tender, no organomegalies, positive bowel sounds  Extremities:   No edema, no cyanosis, good peripheral pulses  Neurological:   Awake, alert, oriented. No obvious focal deficits  Musculoskelatal:  No obvious deformities    Assessment:      Diagnosis Orders   1. Paroxysmal atrial fibrillation (HCC)        2.  Primary hypertension        As above  Cardiac fair for now Plan:  No follow-ups on file. As above  Continue risk factor modification and medical management  Thank you for allowing me to participate in the care of your patient. Please don't hesitate to contact me regarding any further issues related to the patient care    Orders Placed:  No orders of the defined types were placed in this encounter. Medications Prescribed:  No orders of the defined types were placed in this encounter. Discussed use, benefit, and side effects of prescribed medications. All patient questions answered. Pt voicedunderstanding. Instructed to continue current medications, diet and exercise. Continue risk factor modification and medical management. Patient agreed with treatment plan. Follow up as directed.     Electronically signedby Guido Hodgson MD on 10/26/2022 at 11:57 AM

## 2022-11-08 DIAGNOSIS — I48.0 PAROXYSMAL ATRIAL FIBRILLATION (HCC): ICD-10-CM

## 2022-11-09 RX ORDER — APIXABAN 5 MG/1
TABLET, FILM COATED ORAL
Qty: 180 TABLET | Refills: 1 | Status: SHIPPED | OUTPATIENT
Start: 2022-11-09

## 2022-11-09 NOTE — TELEPHONE ENCOUNTER
Date of last visit:  7/12/2022  Date of next visit:  11/30/2022    Requested Prescriptions     Pending Prescriptions Disp Refills    ELIQUIS 5 MG TABS tablet [Pharmacy Med Name: Eliquis 5 MG Oral Tablet] 180 tablet 1     Sig: Take 1 tablet by mouth twice daily

## 2022-12-07 ENCOUNTER — OFFICE VISIT (OUTPATIENT)
Dept: FAMILY MEDICINE CLINIC | Age: 72
End: 2022-12-07

## 2022-12-07 VITALS
BODY MASS INDEX: 37.99 KG/M2 | WEIGHT: 193.5 LBS | RESPIRATION RATE: 12 BRPM | HEART RATE: 60 BPM | HEIGHT: 60 IN | SYSTOLIC BLOOD PRESSURE: 138 MMHG | DIASTOLIC BLOOD PRESSURE: 82 MMHG

## 2022-12-07 DIAGNOSIS — N18.31 STAGE 3A CHRONIC KIDNEY DISEASE (HCC): ICD-10-CM

## 2022-12-07 DIAGNOSIS — E73.9 LACTOSE INTOLERANCE: ICD-10-CM

## 2022-12-07 DIAGNOSIS — E66.9 CLASS 2 OBESITY WITHOUT SERIOUS COMORBIDITY WITH BODY MASS INDEX (BMI) OF 38.0 TO 38.9 IN ADULT, UNSPECIFIED OBESITY TYPE: ICD-10-CM

## 2022-12-07 DIAGNOSIS — M79.605 LEG PAIN, BILATERAL: ICD-10-CM

## 2022-12-07 DIAGNOSIS — K58.0 IRRITABLE BOWEL SYNDROME WITH DIARRHEA: ICD-10-CM

## 2022-12-07 DIAGNOSIS — I48.0 PAROXYSMAL ATRIAL FIBRILLATION (HCC): Primary | ICD-10-CM

## 2022-12-07 DIAGNOSIS — R11.0 NAUSEA: ICD-10-CM

## 2022-12-07 DIAGNOSIS — M79.604 LEG PAIN, BILATERAL: ICD-10-CM

## 2022-12-07 RX ORDER — FAMOTIDINE 20 MG/1
20 TABLET, FILM COATED ORAL 2 TIMES DAILY
COMMUNITY
Start: 2022-11-08

## 2022-12-07 RX ORDER — ONDANSETRON 4 MG/1
4 TABLET, FILM COATED ORAL 3 TIMES DAILY PRN
Qty: 21 TABLET | Refills: 1 | Status: SHIPPED | OUTPATIENT
Start: 2022-12-07

## 2022-12-07 RX ORDER — METOPROLOL SUCCINATE 25 MG/1
TABLET, EXTENDED RELEASE ORAL
Qty: 45 TABLET | Refills: 1 | Status: SHIPPED | OUTPATIENT
Start: 2022-12-07

## 2022-12-07 RX ORDER — CYCLOBENZAPRINE HCL 10 MG
5 TABLET ORAL 2 TIMES DAILY PRN
Qty: 10 TABLET | Refills: 0 | Status: SHIPPED | OUTPATIENT
Start: 2022-12-07 | End: 2022-12-17

## 2022-12-07 ASSESSMENT — ENCOUNTER SYMPTOMS
SHORTNESS OF BREATH: 0
CONSTIPATION: 0
CHEST TIGHTNESS: 0
NAUSEA: 0
COUGH: 0
EYE PAIN: 0
ABDOMINAL PAIN: 0
SORE THROAT: 0
WHEEZING: 0
HEARTBURN: 0

## 2022-12-07 NOTE — PROGRESS NOTES
Subjective:      Patient ID: Kristin Lay is a 67 y.o. female. Ibs   stable   with  loose  stool   and  with lactose   intolerance        Gerd  stable              Covid      in  [place]  and  now  better     Atrial Fibrillation  Presents for follow-up visit. Symptoms are negative for chest pain, dizziness, hypotension, palpitations, shortness of breath, tachycardia and weakness. The symptoms have been stable. Gastroesophageal Reflux  She reports no abdominal pain, no chest pain, no coughing, no heartburn, no nausea, no sore throat or no wheezing. This is a chronic problem. The current episode started more than 1 year ago. The problem occurs rarely. The problem has been resolved. The symptoms are aggravated by certain foods. Pertinent negatives include no fatigue. She has tried a PPI for the symptoms. The treatment provided significant relief. Past Medical History:   Diagnosis Date    Aortic regurgitation     Atrial fibrillation (HCC)     Diverticulosis of colon     Gastritis     GERD (gastroesophageal reflux disease)     IBS (irritable bowel syndrome)     Seasonal allergies     TMJ (dislocation of temporomandibular joint)       Review of Systems   Constitutional:  Negative for appetite change, fatigue and fever. HENT:  Negative for congestion, ear pain, postnasal drip and sore throat. Eyes:  Negative for pain and visual disturbance. Respiratory:  Negative for cough, chest tightness, shortness of breath and wheezing. Cardiovascular:  Negative for chest pain, palpitations and leg swelling. Gastrointestinal:  Negative for abdominal pain, constipation, heartburn and nausea. Genitourinary:  Negative for dysuria and frequency. Musculoskeletal:  Negative for arthralgias, joint swelling, neck pain and neck stiffness. Skin:  Negative for rash. Neurological:  Negative for dizziness, weakness, numbness and headaches. Hematological:  Negative for adenopathy. Does not bruise/bleed easily. Psychiatric/Behavioral:  Negative for behavioral problems and sleep disturbance. The patient is not nervous/anxious. /82 (Site: Right Upper Arm, Position: Sitting, Cuff Size: Large Adult)   Pulse 60   Resp 12   Ht 4' 11.5\" (1.511 m)   Wt 193 lb 8 oz (87.8 kg)   BMI 38.43 kg/m²    Objective:   Physical Exam  Vitals and nursing note reviewed. Constitutional:       Appearance: She is well-developed. HENT:      Head: Normocephalic and atraumatic. Right Ear: External ear normal.      Left Ear: External ear normal.      Nose: Nose normal.   Eyes:      General: No scleral icterus. Conjunctiva/sclera: Conjunctivae normal.      Pupils: Pupils are equal, round, and reactive to light. Neck:      Thyroid: No thyromegaly. Vascular: No JVD. Cardiovascular:      Rate and Rhythm: Normal rate and regular rhythm. Heart sounds: Murmur (1 to 2/ 6 ag) heard. Pulmonary:      Effort: Pulmonary effort is normal.      Breath sounds: Normal breath sounds. No wheezing or rales. Abdominal:      General: Bowel sounds are normal. There is no distension. Palpations: Abdomen is soft. There is no mass. Tenderness: There is no abdominal tenderness. Musculoskeletal:         General: No tenderness. Normal range of motion. Cervical back: Normal range of motion and neck supple. Lymphadenopathy:      Cervical: No cervical adenopathy. Skin:     General: Skin is warm and dry. Findings: No rash. Neurological:      Mental Status: She is alert and oriented to person, place, and time. Cranial Nerves: No cranial nerve deficit. Deep Tendon Reflexes: Reflexes are normal and symmetric. Assessment:           ICD-10-CM    1. Paroxysmal atrial fibrillation (HCC)  I48.0 metoprolol succinate (TOPROL XL) 25 MG extended release tablet      2. Nausea  R11.0 ondansetron (ZOFRAN) 4 MG tablet      3. Lactose intolerance  E73.9       4.  Stage 3a chronic kidney disease (HCC)  N18.31 5. Irritable bowel syndrome with diarrhea  K58.0       6. Leg pain, bilateral  M79.604 cyclobenzaprine (FLEXERIL) 10 MG tablet    M79.605       7. Class 2 obesity without serious comorbidity with body mass index (BMI) of 38.0 to 38.9 in adult, unspecified obesity type  E66.9     Z68.38              Plan:      Current Outpatient Medications   Medication Sig Dispense Refill    famotidine (PEPCID) 20 MG tablet Take 20 mg by mouth 2 times daily      ondansetron (ZOFRAN) 4 MG tablet Take 1 tablet by mouth 3 times daily as needed for Nausea or Vomiting 21 tablet 1    cyclobenzaprine (FLEXERIL) 10 MG tablet Take 0.5 tablets by mouth 2 times daily as needed for Muscle spasms 10 tablet 0    metoprolol succinate (TOPROL XL) 25 MG extended release tablet 1/2  Tab po daily 45 tablet 1    ELIQUIS 5 MG TABS tablet Take 1 tablet by mouth twice daily 180 tablet 1    pyridoxine (B-6) 100 MG tablet Take 50 mg by mouth daily       vitamin D (CHOLECALCIFEROL) 1000 UNIT TABS tablet Take 1,000 Units by mouth daily       Multiple Vitamins-Minerals (THERAPEUTIC MULTIVITAMIN-MINERALS) tablet Take 1 tablet by mouth daily       loratadine (CLARITIN) 10 MG tablet Take 10 mg by mouth daily as needed       calcium carbonate (OSCAL) 500 MG TABS tablet Take 500 mg by mouth daily (Patient not taking: Reported on 12/7/2022)       No current facility-administered medications for this visit. No orders of the defined types were placed in this encounter.          See in  6 mths  Natasha Izaguirre MD

## 2023-02-17 DIAGNOSIS — I48.0 PAROXYSMAL ATRIAL FIBRILLATION (HCC): ICD-10-CM

## 2023-02-17 NOTE — TELEPHONE ENCOUNTER
Date of last visit:  2022  Date of next visit:  2023    Requested Prescriptions     Pending Prescriptions Disp Refills    metoprolol succinate (TOPROL XL) 25 MG extended release tablet 45 tablet 1     Si/2  Tab po daily    famotidine (PEPCID) 20 MG tablet 180 tablet      Sig: Take 1 tablet by mouth 2 times daily

## 2023-02-17 NOTE — TELEPHONE ENCOUNTER
[facility] called requesting a refill of their:    famotidine (PEPCID) 20 MG tablet BID  metoprolol succinate (TOPROL XL) 25 MG extended release tablet 1/2 tab daily    Send 90 day supply to [facility] [address]

## 2023-02-18 RX ORDER — METOPROLOL SUCCINATE 25 MG/1
TABLET, EXTENDED RELEASE ORAL
Qty: 45 TABLET | Refills: 1 | Status: SHIPPED | OUTPATIENT
Start: 2023-02-18

## 2023-02-18 RX ORDER — FAMOTIDINE 20 MG/1
20 TABLET, FILM COATED ORAL 2 TIMES DAILY
Qty: 180 TABLET | Refills: 1 | Status: SHIPPED | OUTPATIENT
Start: 2023-02-18

## 2023-03-10 NOTE — PROGRESS NOTES
PROGRESS NOTE      Patient:  Keely Mcgrath      Unit/Bed:5K-07/007-A    YOB: 1950    MRN: [id number]       Acct: [de-identified]     PCP: Debbie Mackay MD    Date of Admission: 1/31/2022      Assessment/Plan:    Anticipated Discharge in : Pending. Active Hospital Problems    Diagnosis Date Noted    Dysthymia [F34.1] 02/02/2022    History of COVID-19 [Z86.16]     Chronic diarrhea [K52.9]     Generalized weakness [R53.1] 02/01/2022    Morbidly obese ([facility] [address] [E66.01] 09/21/2020    IBS (irritable bowel syndrome) [K58.9]     GERD (gastroesophageal reflux disease) [K21.9]        New on Set Afib with RVR - Feb/02/2022, noted to have new detected Afib with RVR with SOB. Converted with Lopressor. Currently NSR. Echocardiogram WNL, Remains [id number] Score: 2 Currently only on Lovenox at this time, no [address] on [place]. Continue telemetry. Continue lopressor. Follow up with Cardiology OPT. Generalized Weakness - unspecified etiology, likely 2/2 interim electrolyte changes in setting of Diarrhea below. Continues Improving. Electrolytes stable, Continue PT/OT  Continue currently fluids. Diarrhea - Hx of, covid-19 recent infection, improved Feb/03/2022, control with imodium symptomatically. Stool studies negative. Monitor electrolytes, replete as necessary, continue current fluids as decreased PO intake. ,  Complicate by IBS hx below. Hx of IBS - Hx of monitor, see Diarrhea above. Hx of GERD - Continue PPI. Add Pepcid BID. Recent Covid-19 - Hx of, recent test negative. CXR remarkable for patchy infiltrates. Chief Complaint: Generalized Weakness, Diarrhea. Hospital Course:    Per initial H&P:  \"This is a 70-year-old female with past medical history significant for GERD, IBS, TMJ, gastritis, aortic regurgitation presenting with generalized weakness for 2 weeks. Patient was too weak to get around at home and decided to come to the emergency room for evaluation.   History was provided by [Care Plan reviewed and provided to patient/caregiver] : Care plan reviewed and provided to patient/caregiver patient and family members who were at bedside. Patient was having nonspecific symptoms including mild cough, diarrhea and vomiting about 2 weeks ago. Patient symptoms continued and patient had tested positive at home with COVID-19 about a week ago. Patient's diarrhea did not stop and patient was getting weaker and weaker. Patient then decided to come to emergency room for evaluation. Patient's Covid test was negative in the emergency room. Patient went to bathroom and had bowel movements for at least 3 times during ER stay. Patient was given 1 L of fluid in the emergency room. Blood work was overall unremarkable. Patient and patient's family member felt uncomfortable going back home at this time. \"    Subjective:      Pt seen and examined. Overnight, no acute events. Remains NSR. No gross compliants, mild cough which may be related to covid-19 sequelae, working on improving PO intake. Diarrhea improved. Remains some weakness, but 5/5 all extremities when laying. Will continue to work with PT OT, continue to replete fluids and electrolytes as needed. Review of Systems   Constitutional: Positive for activity change and appetite change. Negative for chills, diaphoresis, fatigue and fever. Respiratory: Positive for cough. Negative for choking, shortness of breath and wheezing. Cardiovascular: Negative for chest pain, palpitations and leg swelling. Gastrointestinal: Negative for abdominal distention, abdominal pain, constipation, diarrhea, nausea and vomiting. Genitourinary: Negative for dyspareunia, dysuria, frequency, hematuria, pelvic pain and urgency. Musculoskeletal: Positive for gait problem. Negative for arthralgias, back pain, joint swelling, myalgias, neck pain and neck stiffness. Skin: Negative for pallor, rash and wound. Neurological: Negative for dizziness, weakness, light-headedness and headaches.    Psychiatric/Behavioral: Negative for behavioral problems, confusion, sleep [Understands and communicates without difficulty] : Patient/Caregiver understands and communicates without difficulty disturbance and suicidal ideas. The patient is not nervous/anxious. Medications:  Reviewed    Infusion Medications    sodium chloride      dextrose 5 % and 0.9 % NaCl 100 mL/hr at 02/03/22 2947     Scheduled Medications    famotidine  20 mg Oral BID    phosphorus replacement protocol   Other RX Placeholder    sodium chloride flush  10 mL IntraVENous 2 times per day    enoxaparin  40 mg SubCUTAneous Daily    pantoprazole  40 mg Oral QAM AC    metoprolol tartrate  12.5 mg Oral BID     PRN Meds: HYDROcodone-chlorpheniramine, loperamide, diphenhydrAMINE, sodium chloride flush, sodium chloride, ondansetron **OR** ondansetron, polyethylene glycol, acetaminophen **OR** acetaminophen      Intake/Output Summary (Last 24 hours) at 2/3/2022 1038  Last data filed at 2/3/2022 1007  Gross per 24 hour   Intake 3485.72 ml   Output --   Net 3485.72 ml       Diet:  ADULT DIET; Regular    Exam:  /60   Pulse 88   Temp 98.3 °F (36.8 °C) (Oral)   Resp 18   Ht 4' 11\" (1.499 m)   Wt 180 lb (81.6 kg)   SpO2 91%   BMI 36.36 kg/m²     Physical Exam  Constitutional:       General: She is not in acute distress. Appearance: Normal appearance. She is obese. She is not ill-appearing or diaphoretic. HENT:      Head: Normocephalic and atraumatic. Nose: Nose normal. No congestion or rhinorrhea. Mouth/Throat:      Mouth: Mucous membranes are moist.      Pharynx: Oropharynx is clear. No oropharyngeal exudate or posterior oropharyngeal erythema. Eyes:      General: No scleral icterus. Right eye: No discharge. Left eye: No discharge. Extraocular Movements: Extraocular movements intact. Conjunctiva/sclera: Conjunctivae normal.      Pupils: Pupils are equal, round, and reactive to light. Cardiovascular:      Rate and Rhythm: Normal rate and regular rhythm. Pulses: Normal pulses. Heart sounds: Normal heart sounds. No murmur heard. No friction rub. No gallop.     Pulmonary: Effort: Pulmonary effort is normal. No respiratory distress. Breath sounds: Normal breath sounds. No wheezing, rhonchi or rales. Abdominal:      General: Abdomen is flat. Bowel sounds are normal. There is no distension. Tenderness: There is no abdominal tenderness. There is no guarding. Musculoskeletal:         General: No swelling, tenderness or signs of injury. Normal range of motion. Right lower leg: No edema. Left lower leg: No edema. Skin:     General: Skin is warm and dry. Capillary Refill: Capillary refill takes 2 to 3 seconds. Coloration: Skin is not jaundiced or pale. Findings: No erythema or rash. Neurological:      General: No focal deficit present. Mental Status: She is alert and oriented to person, place, and time. Cranial Nerves: No cranial nerve deficit. Sensory: No sensory deficit. Motor: Weakness (5/5 all limbs however pt subjectively feels weak. Improving)) present. Labs:   Recent Labs     02/02/22  0340 02/02/22  0526 02/03/22  0547   WBC 6.0 6.7 5.9   HGB 11.8* 12.5 11.2*   HCT 37.5 38.7 34.9*    215 224     Recent Labs     02/02/22  0340 02/02/22  0526 02/03/22  0547    138 140   K 3.6 3.7 4.3    107 110   CO2 21* 21* 21*   BUN 11 11 6*   CREATININE 0.8 0.8 0.9   CALCIUM 8.3* 8.4* 8.1*   PHOS 2.5 2.1* 2.7     Recent Labs     01/31/22 2032 02/01/22  0455   AST 41* 36   ALT 30 27   BILITOT 0.4 0.4   ALKPHOS 59 62     No results for input(s): INR in the last 72 hours. No results for input(s): Mikaela Larry in the last 72 hours. Urinalysis:      Lab Results   Component Value Date    NITRU NEGATIVE 01/31/2022    BLOODU NEGATIVE 01/31/2022    GLUCOSEU NEGATIVE 01/31/2022       Radiology:  XR CHEST PORTABLE   Final Result   Impression:   Moderate bilateral patchy infiltrates. This document has been electronically signed by:  Mookie Manley MD on    02/02/2022 06:03 AM      XR CHEST PORTABLE Final Result   1. Normal heart size. No effusion. 2. Relatively mild Multifocal patchy groundglass infiltrates scattered in both lungs, consistent with Covid pneumonia. **This report has been created using voice recognition software. It may contain minor errors which are inherent in voice recognition technology. **      Final report electronically signed by Dr. Mathieu Angel on 1/31/2022 7:38 PM          Diet: ADULT DIET; Regular    DVT prophylaxis: [x] Lovenox                                 [] SCDs                                 [] SQ Heparin                                 [] Encourage ambulation           [] Already on Anticoagulation     Disposition:    [x] Home       [] TCU       [] Rehab       [] Psych       [] SNF       [] [place]       [] Other-    Code Status: Full Code    PT/OT Eval Status: Pending      Electronically signed by Krysta Heaton MD on 2/3/2022 at 10:38 AM    This note was electronically signed. Parts of this note may have been dictated by use of voice recognition software and electronically transcribed. The note may contain errors not detected in proofreading. Please refer to my supervising physician's documentation if my documentation differs.

## 2023-03-29 ENCOUNTER — TELEPHONE (OUTPATIENT)
Dept: FAMILY MEDICINE CLINIC | Age: 73
End: 2023-03-29

## 2023-03-29 ENCOUNTER — TELEMEDICINE (OUTPATIENT)
Dept: FAMILY MEDICINE CLINIC | Age: 73
End: 2023-03-29

## 2023-03-29 DIAGNOSIS — U07.1 UPPER RESPIRATORY TRACT INFECTION DUE TO COVID-19 VIRUS: Primary | ICD-10-CM

## 2023-03-29 DIAGNOSIS — N18.31 STAGE 3A CHRONIC KIDNEY DISEASE (HCC): ICD-10-CM

## 2023-03-29 DIAGNOSIS — J06.9 UPPER RESPIRATORY TRACT INFECTION DUE TO COVID-19 VIRUS: Primary | ICD-10-CM

## 2023-03-29 DIAGNOSIS — R11.0 NAUSEA: ICD-10-CM

## 2023-03-29 DIAGNOSIS — I82.433 ACUTE DEEP VEIN THROMBOSIS (DVT) OF POPLITEAL VEIN OF BOTH LOWER EXTREMITIES (HCC): ICD-10-CM

## 2023-03-29 DIAGNOSIS — I48.0 PAROXYSMAL ATRIAL FIBRILLATION (HCC): ICD-10-CM

## 2023-03-29 PROCEDURE — G8400 PT W/DXA NO RESULTS DOC: HCPCS | Performed by: FAMILY MEDICINE

## 2023-03-29 PROCEDURE — G8417 CALC BMI ABV UP PARAM F/U: HCPCS | Performed by: FAMILY MEDICINE

## 2023-03-29 PROCEDURE — 1036F TOBACCO NON-USER: CPT | Performed by: FAMILY MEDICINE

## 2023-03-29 PROCEDURE — 1123F ACP DISCUSS/DSCN MKR DOCD: CPT | Performed by: FAMILY MEDICINE

## 2023-03-29 PROCEDURE — G8427 DOCREV CUR MEDS BY ELIG CLIN: HCPCS | Performed by: FAMILY MEDICINE

## 2023-03-29 PROCEDURE — 99213 OFFICE O/P EST LOW 20 MIN: CPT | Performed by: FAMILY MEDICINE

## 2023-03-29 RX ORDER — ONDANSETRON 4 MG/1
4 TABLET, FILM COATED ORAL 3 TIMES DAILY PRN
Qty: 21 TABLET | Refills: 1 | Status: SHIPPED | OUTPATIENT
Start: 2023-03-29

## 2023-03-29 RX ORDER — BENZONATATE 200 MG/1
200 CAPSULE ORAL 3 TIMES DAILY PRN
Qty: 30 CAPSULE | Refills: 0 | Status: SHIPPED | OUTPATIENT
Start: 2023-03-29

## 2023-03-29 SDOH — ECONOMIC STABILITY: HOUSING INSECURITY
IN THE LAST 12 MONTHS, WAS THERE A TIME WHEN YOU DID NOT HAVE A STEADY PLACE TO SLEEP OR SLEPT IN A SHELTER (INCLUDING NOW)?: NO

## 2023-03-29 SDOH — ECONOMIC STABILITY: INCOME INSECURITY: HOW HARD IS IT FOR YOU TO PAY FOR THE VERY BASICS LIKE FOOD, HOUSING, MEDICAL CARE, AND HEATING?: NOT HARD AT ALL

## 2023-03-29 SDOH — ECONOMIC STABILITY: FOOD INSECURITY: WITHIN THE PAST 12 MONTHS, THE FOOD YOU BOUGHT JUST DIDN'T LAST AND YOU DIDN'T HAVE MONEY TO GET MORE.: NEVER TRUE

## 2023-03-29 SDOH — ECONOMIC STABILITY: FOOD INSECURITY: WITHIN THE PAST 12 MONTHS, YOU WORRIED THAT YOUR FOOD WOULD RUN OUT BEFORE YOU GOT MONEY TO BUY MORE.: NEVER TRUE

## 2023-03-29 ASSESSMENT — PATIENT HEALTH QUESTIONNAIRE - PHQ9
SUM OF ALL RESPONSES TO PHQ QUESTIONS 1-9: 0
1. LITTLE INTEREST OR PLEASURE IN DOING THINGS: 0
SUM OF ALL RESPONSES TO PHQ QUESTIONS 1-9: 0
SUM OF ALL RESPONSES TO PHQ9 QUESTIONS 1 & 2: 0
SUM OF ALL RESPONSES TO PHQ QUESTIONS 1-9: 0
8. MOVING OR SPEAKING SO SLOWLY THAT OTHER PEOPLE COULD HAVE NOTICED. OR THE OPPOSITE, BEING SO FIGETY OR RESTLESS THAT YOU HAVE BEEN MOVING AROUND A LOT MORE THAN USUAL: 0
5. POOR APPETITE OR OVEREATING: 0
7. TROUBLE CONCENTRATING ON THINGS, SUCH AS READING THE NEWSPAPER OR WATCHING TELEVISION: 0
4. FEELING TIRED OR HAVING LITTLE ENERGY: 0
6. FEELING BAD ABOUT YOURSELF - OR THAT YOU ARE A FAILURE OR HAVE LET YOURSELF OR YOUR FAMILY DOWN: 0
10. IF YOU CHECKED OFF ANY PROBLEMS, HOW DIFFICULT HAVE THESE PROBLEMS MADE IT FOR YOU TO DO YOUR WORK, TAKE CARE OF THINGS AT HOME, OR GET ALONG WITH OTHER PEOPLE: 0
9. THOUGHTS THAT YOU WOULD BE BETTER OFF DEAD, OR OF HURTING YOURSELF: 0
2. FEELING DOWN, DEPRESSED OR HOPELESS: 0
SUM OF ALL RESPONSES TO PHQ QUESTIONS 1-9: 0
3. TROUBLE FALLING OR STAYING ASLEEP: 0

## 2023-03-29 ASSESSMENT — ENCOUNTER SYMPTOMS
SORE THROAT: 0
ABDOMINAL PAIN: 0
NAUSEA: 0
SHORTNESS OF BREATH: 0
CHEST TIGHTNESS: 0
CONSTIPATION: 0
EYE PAIN: 0
WHEEZING: 0
COUGH: 1

## 2023-03-29 NOTE — PROGRESS NOTES
Kenia Jenkins agreed to Video Chat/Exam in presence of Dr Darshana Fuchs and myself. Verified who was present in room with Kenia Jenkins. Kenia Jenkins informed the e-mail address used to Face Time cannot be used to contact the Provider, if they are any questions or concerns they need to call the office directly. Kenia Jenkins stated understanding.
visit. Rx  with paxlovid     No orders of the defined types were placed in this encounter. Positive For Covid-19  This is a new problem. The current episode started yesterday. Associated symptoms include arthralgias, congestion, coughing and a fever. Pertinent negatives include no abdominal pain, chest pain, fatigue, headaches, joint swelling, nausea, neck pain, numbness, rash, sore throat, vertigo or weakness. Nothing aggravates the symptoms. She has tried acetaminophen for the symptoms. The treatment provided mild relief. Past Medical History:   Diagnosis Date    Aortic regurgitation     Atrial fibrillation (HCC)     Diverticulosis of colon     Gastritis     GERD (gastroesophageal reflux disease)     IBS (irritable bowel syndrome)     Seasonal allergies     TMJ (dislocation of temporomandibular joint)        Hx  of  atrri;a  fib  on  eliquis      Crf  stable       Dvt  stable    Review of Systems   Constitutional:  Positive for fever. Negative for appetite change and fatigue. HENT:  Positive for congestion. Negative for ear pain, postnasal drip and sore throat. Eyes:  Negative for pain and visual disturbance. Respiratory:  Positive for cough. Negative for chest tightness, shortness of breath and wheezing. Cardiovascular:  Negative for chest pain, palpitations and leg swelling. Gastrointestinal:  Negative for abdominal pain, constipation and nausea. Genitourinary:  Negative for dysuria and frequency. Musculoskeletal:  Positive for arthralgias. Negative for joint swelling, neck pain and neck stiffness. Skin:  Negative for rash. Neurological:  Negative for dizziness, vertigo, weakness, numbness and headaches. Hematological:  Negative for adenopathy. Does not bruise/bleed easily. Psychiatric/Behavioral:  Negative for behavioral problems and sleep disturbance. The patient is not nervous/anxious.          Objective   Patient-Reported Vitals  No data recorded     Physical

## 2023-03-29 NOTE — TELEPHONE ENCOUNTER
Pts spouse called in and pt tested positive for covid. She is having Headaches, fever of 102, aches and pains, coughing, when pt coughs she brings up phlegm but its clear. Pt complains of more of head congestion.      Pt is scheduled todat for vv Dr Judy Lr for eligio

## 2023-03-30 ENCOUNTER — TELEPHONE (OUTPATIENT)
Dept: FAMILY MEDICINE CLINIC | Age: 73
End: 2023-03-30

## 2023-03-30 NOTE — TELEPHONE ENCOUNTER
Per verbal order from Dr Travis Yadav: ATB will not help, it is a Virus and will have to work its way through her system. Raquel Pradhan informed by Phone.

## 2023-03-30 NOTE — TELEPHONE ENCOUNTER
PT called stating that she tested positive for covid yesterday. She was given a RX for paxlovid but with her having to go off the blood thinner she is not wanting to do that with her history of blood clots. She is asking if she would be able to get a RX for Zpak/azithromycin to help with her SX.     [facility] on [address] may be reached at [phone]

## 2023-06-18 DIAGNOSIS — I48.0 PAROXYSMAL ATRIAL FIBRILLATION (HCC): ICD-10-CM

## 2023-06-19 NOTE — TELEPHONE ENCOUNTER
Date of last visit:  3/29/2023  Date of next visit:  Visit date not found    Requested Prescriptions     Pending Prescriptions Disp Refills    ELIQUIS 5 MG TABS tablet [Pharmacy Med Name: Eliquis 5 MG Oral Tablet] 180 tablet 0     Sig: Take 1 tablet by mouth twice daily
2 seconds or less

## 2023-06-20 RX ORDER — APIXABAN 5 MG/1
TABLET, FILM COATED ORAL
Qty: 180 TABLET | Refills: 0 | Status: SHIPPED | OUTPATIENT
Start: 2023-06-20

## 2023-09-08 DIAGNOSIS — I48.0 PAROXYSMAL ATRIAL FIBRILLATION (HCC): ICD-10-CM

## 2023-09-08 RX ORDER — APIXABAN 5 MG/1
TABLET, FILM COATED ORAL
Qty: 180 TABLET | Refills: 0 | Status: SHIPPED | OUTPATIENT
Start: 2023-09-08 | End: 2023-09-11 | Stop reason: SDUPTHER

## 2023-09-08 NOTE — TELEPHONE ENCOUNTER
Date of last visit:  3/29/2023  Date of next visit:  Visit date not found    Requested Prescriptions     Pending Prescriptions Disp Refills    ELIQUIS 5 MG TABS tablet [Pharmacy Med Name: Eliquis 5 MG Oral Tablet] 180 tablet 0     Sig: Take 1 tablet by mouth twice daily

## 2023-09-11 DIAGNOSIS — I48.0 PAROXYSMAL ATRIAL FIBRILLATION (HCC): ICD-10-CM

## 2023-09-11 RX ORDER — METOPROLOL SUCCINATE 25 MG/1
TABLET, EXTENDED RELEASE ORAL
Qty: 45 TABLET | Refills: 1 | Status: SHIPPED | OUTPATIENT
Start: 2023-09-11

## 2023-09-11 RX ORDER — FAMOTIDINE 20 MG/1
20 TABLET, FILM COATED ORAL 2 TIMES DAILY
Qty: 180 TABLET | Refills: 0 | Status: SHIPPED | OUTPATIENT
Start: 2023-09-11

## 2023-09-11 NOTE — TELEPHONE ENCOUNTER
Pt states she needs refills on Famotidine 20 mg , metoprolol 25 mg, eliquis 5mg   [facility], she would also like to know if dr can increase quantity of tablets for the Metoprolol

## 2023-09-11 NOTE — TELEPHONE ENCOUNTER
Wyn Cooks called requesting a refill of the below medication which has been pended for you:     Requested Prescriptions     Pending Prescriptions Disp Refills    metoprolol succinate (TOPROL XL) 25 MG extended release tablet 45 tablet 1     Si/2  Tab po daily    apixaban (ELIQUIS) 5 MG TABS tablet 180 tablet 1     Sig: Take 1 tablet by mouth 2 times daily    famotidine (PEPCID) 20 MG tablet 180 tablet 1     Sig: Take 1 tablet by mouth 2 times daily       Last Appointment Date: 3/29/2023  Next Appointment Date: Visit date not found    Allergies   Allergen Reactions    Pcn [Penicillins] Hives    Shellfish-Derived Products Diarrhea and Nausea And Vomiting warm

## 2023-10-26 ENCOUNTER — OFFICE VISIT (OUTPATIENT)
Dept: CARDIOLOGY CLINIC | Age: 73
End: 2023-10-26
Payer: MEDICARE

## 2023-10-26 VITALS
HEIGHT: 60 IN | WEIGHT: 191 LBS | DIASTOLIC BLOOD PRESSURE: 72 MMHG | SYSTOLIC BLOOD PRESSURE: 134 MMHG | BODY MASS INDEX: 37.5 KG/M2 | HEART RATE: 78 BPM

## 2023-10-26 DIAGNOSIS — I48.0 PAROXYSMAL ATRIAL FIBRILLATION (HCC): Primary | ICD-10-CM

## 2023-10-26 PROCEDURE — 1090F PRES/ABSN URINE INCON ASSESS: CPT | Performed by: NUCLEAR MEDICINE

## 2023-10-26 PROCEDURE — 93000 ELECTROCARDIOGRAM COMPLETE: CPT | Performed by: NUCLEAR MEDICINE

## 2023-10-26 PROCEDURE — 99213 OFFICE O/P EST LOW 20 MIN: CPT | Performed by: NUCLEAR MEDICINE

## 2023-10-26 PROCEDURE — 1123F ACP DISCUSS/DSCN MKR DOCD: CPT | Performed by: NUCLEAR MEDICINE

## 2023-10-26 PROCEDURE — G8400 PT W/DXA NO RESULTS DOC: HCPCS | Performed by: NUCLEAR MEDICINE

## 2023-10-26 PROCEDURE — G8417 CALC BMI ABV UP PARAM F/U: HCPCS | Performed by: NUCLEAR MEDICINE

## 2023-10-26 PROCEDURE — G8484 FLU IMMUNIZE NO ADMIN: HCPCS | Performed by: NUCLEAR MEDICINE

## 2023-10-26 PROCEDURE — G8427 DOCREV CUR MEDS BY ELIG CLIN: HCPCS | Performed by: NUCLEAR MEDICINE

## 2023-10-26 PROCEDURE — 1036F TOBACCO NON-USER: CPT | Performed by: NUCLEAR MEDICINE

## 2023-10-26 PROCEDURE — 3017F COLORECTAL CA SCREEN DOC REV: CPT | Performed by: NUCLEAR MEDICINE

## 2023-10-26 NOTE — PROGRESS NOTES
Patient here for check up. EKG done today. Patient denies any cardiac symptoms.
daily      calcium carbonate (OSCAL) 500 MG TABS tablet Take 1 tablet by mouth daily      loratadine (CLARITIN) 10 MG tablet Take 1 tablet by mouth daily      benzonatate (TESSALON) 200 MG capsule Take 1 capsule by mouth 3 times daily as needed for Cough (Patient not taking: Reported on 10/26/2023) 30 capsule 0     No current facility-administered medications for this visit. Allergies   Allergen Reactions    Pcn [Penicillins] Hives    Zinc     Shellfish-Derived Products Diarrhea and Nausea And Vomiting     Health Maintenance   Topic Date Due    COVID-19 Vaccine (1) Never done    Hepatitis C screen  Never done    Breast cancer screen  Never done    Shingles vaccine (1 of 2) Never done    DEXA (modify frequency per FRAX score)  Never done    Hepatitis B vaccine (3 of 3 - 19+ 3-dose series) 12/04/2014    Pneumococcal 65+ years Vaccine (1 - PCV) Never done    Colorectal Cancer Screen  01/01/2017    Lipids  02/06/2018    Annual Wellness Visit (AWV)  03/25/2023    GFR test (Diabetes, CKD 3-4, OR last GFR 15-59)  04/20/2023    Flu vaccine (1) Never done    DTaP/Tdap/Td vaccine (2 - Td or Tdap) 09/12/2023    Depression Monitoring  03/29/2024    Hepatitis A vaccine  Aged Out    Hib vaccine  Aged Out    Meningococcal (ACWY) vaccine  Aged Out    Depression Screen  Discontinued       Subjective:  General:   No fever, no chills, No fatigue or weight loss  Pulmonary:    No dyspnea, no wheezing  Cardiac:    Denies recent chest pain,   GI:     No nausea or vomiting, no abdominal pain  Neuro:     No dizziness or light headedness,   Musculoskeletal:  No recent active issues  Extremities:   No edema, no obvious claudication       Objective:  General:   Well developed, well nourished  Lungs:    Clear to auscultation  Heart:    Normal S1 S2, Slight murmur.  no rubs, no gallops  Abdomen:   Soft, non tender, no organomegalies, positive bowel sounds  Extremities:   No edema, no cyanosis, good peripheral pulses  Neurological:   Awake,

## 2023-12-25 DIAGNOSIS — I48.0 PAROXYSMAL ATRIAL FIBRILLATION (HCC): ICD-10-CM

## 2023-12-26 NOTE — TELEPHONE ENCOUNTER
Date of last visit:  3/29/2023  Date of next visit:  Visit date not found    Requested Prescriptions     Pending Prescriptions Disp Refills    apixaban (ELIQUIS) 5 MG TABS tablet 180 tablet 1     Sig: Take 1 tablet by mouth 2 times daily    famotidine (PEPCID) 20 MG tablet 180 tablet 1     Sig: Take 1 tablet by mouth 2 times daily

## 2023-12-27 RX ORDER — FAMOTIDINE 20 MG/1
20 TABLET, FILM COATED ORAL 2 TIMES DAILY
Qty: 180 TABLET | Refills: 1 | Status: SHIPPED | OUTPATIENT
Start: 2023-12-27

## 2024-01-15 ENCOUNTER — TELEPHONE (OUTPATIENT)
Dept: FAMILY MEDICINE CLINIC | Age: 74
End: 2024-01-15

## 2024-01-15 NOTE — TELEPHONE ENCOUNTER
MOM to return call to office     Patient is due for an Appt- please schedule her a AWV when she calls back.

## 2024-02-19 DIAGNOSIS — I48.0 PAROXYSMAL ATRIAL FIBRILLATION (HCC): ICD-10-CM

## 2024-02-19 RX ORDER — METOPROLOL SUCCINATE 25 MG/1
TABLET, EXTENDED RELEASE ORAL
Qty: 45 TABLET | Refills: 0 | Status: SHIPPED | OUTPATIENT
Start: 2024-02-19

## 2024-02-19 NOTE — TELEPHONE ENCOUNTER
Date of last visit:  3/29/2023  Date of next visit:  Visit date not found    Requested Prescriptions     Pending Prescriptions Disp Refills    metoprolol succinate (TOPROL XL) 25 MG extended release tablet 45 tablet 1     Si/2  Tab po daily

## 2024-02-29 ENCOUNTER — OFFICE VISIT (OUTPATIENT)
Dept: FAMILY MEDICINE CLINIC | Age: 74
End: 2024-02-29

## 2024-02-29 VITALS
HEART RATE: 76 BPM | BODY MASS INDEX: 37.97 KG/M2 | SYSTOLIC BLOOD PRESSURE: 138 MMHG | RESPIRATION RATE: 16 BRPM | HEIGHT: 60 IN | WEIGHT: 193.38 LBS | DIASTOLIC BLOOD PRESSURE: 84 MMHG

## 2024-02-29 DIAGNOSIS — I82.433 ACUTE DEEP VEIN THROMBOSIS (DVT) OF POPLITEAL VEIN OF BOTH LOWER EXTREMITIES (HCC): ICD-10-CM

## 2024-02-29 DIAGNOSIS — K21.9 GASTROESOPHAGEAL REFLUX DISEASE WITHOUT ESOPHAGITIS: ICD-10-CM

## 2024-02-29 DIAGNOSIS — D68.69 SECONDARY HYPERCOAGULABLE STATE (HCC): ICD-10-CM

## 2024-02-29 DIAGNOSIS — I48.0 PAROXYSMAL ATRIAL FIBRILLATION (HCC): ICD-10-CM

## 2024-02-29 DIAGNOSIS — N18.31 STAGE 3A CHRONIC KIDNEY DISEASE (HCC): ICD-10-CM

## 2024-02-29 DIAGNOSIS — Z82.3 FAMILY HISTORY OF CEREBROVASCULAR ACCIDENT: ICD-10-CM

## 2024-02-29 DIAGNOSIS — J30.2 SEASONAL ALLERGIES: ICD-10-CM

## 2024-02-29 DIAGNOSIS — Z00.00 MEDICARE ANNUAL WELLNESS VISIT, SUBSEQUENT: Primary | ICD-10-CM

## 2024-02-29 PROCEDURE — G8400 PT W/DXA NO RESULTS DOC: HCPCS | Performed by: FAMILY MEDICINE

## 2024-02-29 PROCEDURE — 1036F TOBACCO NON-USER: CPT | Performed by: FAMILY MEDICINE

## 2024-02-29 PROCEDURE — 99213 OFFICE O/P EST LOW 20 MIN: CPT | Performed by: FAMILY MEDICINE

## 2024-02-29 PROCEDURE — G0439 PPPS, SUBSEQ VISIT: HCPCS | Performed by: FAMILY MEDICINE

## 2024-02-29 PROCEDURE — G8417 CALC BMI ABV UP PARAM F/U: HCPCS | Performed by: FAMILY MEDICINE

## 2024-02-29 PROCEDURE — 3017F COLORECTAL CA SCREEN DOC REV: CPT | Performed by: FAMILY MEDICINE

## 2024-02-29 PROCEDURE — 1123F ACP DISCUSS/DSCN MKR DOCD: CPT | Performed by: FAMILY MEDICINE

## 2024-02-29 PROCEDURE — G8427 DOCREV CUR MEDS BY ELIG CLIN: HCPCS | Performed by: FAMILY MEDICINE

## 2024-02-29 ASSESSMENT — ENCOUNTER SYMPTOMS
NAUSEA: 0
EYE PAIN: 0
SHORTNESS OF BREATH: 0
SORE THROAT: 0
COUGH: 0
WHEEZING: 0
CHEST TIGHTNESS: 0
CONSTIPATION: 0
ABDOMINAL PAIN: 0

## 2024-02-29 ASSESSMENT — PATIENT HEALTH QUESTIONNAIRE - PHQ9
2. FEELING DOWN, DEPRESSED OR HOPELESS: 0
6. FEELING BAD ABOUT YOURSELF - OR THAT YOU ARE A FAILURE OR HAVE LET YOURSELF OR YOUR FAMILY DOWN: 0
SUM OF ALL RESPONSES TO PHQ9 QUESTIONS 1 & 2: 0
1. LITTLE INTEREST OR PLEASURE IN DOING THINGS: 0
SUM OF ALL RESPONSES TO PHQ QUESTIONS 1-9: 0
4. FEELING TIRED OR HAVING LITTLE ENERGY: 0
SUM OF ALL RESPONSES TO PHQ QUESTIONS 1-9: 0
SUM OF ALL RESPONSES TO PHQ QUESTIONS 1-9: 0
5. POOR APPETITE OR OVEREATING: 0
8. MOVING OR SPEAKING SO SLOWLY THAT OTHER PEOPLE COULD HAVE NOTICED. OR THE OPPOSITE, BEING SO FIGETY OR RESTLESS THAT YOU HAVE BEEN MOVING AROUND A LOT MORE THAN USUAL: 0
3. TROUBLE FALLING OR STAYING ASLEEP: 0
9. THOUGHTS THAT YOU WOULD BE BETTER OFF DEAD, OR OF HURTING YOURSELF: 0
SUM OF ALL RESPONSES TO PHQ QUESTIONS 1-9: 0
7. TROUBLE CONCENTRATING ON THINGS, SUCH AS READING THE NEWSPAPER OR WATCHING TELEVISION: 0
10. IF YOU CHECKED OFF ANY PROBLEMS, HOW DIFFICULT HAVE THESE PROBLEMS MADE IT FOR YOU TO DO YOUR WORK, TAKE CARE OF THINGS AT HOME, OR GET ALONG WITH OTHER PEOPLE: 0

## 2024-02-29 ASSESSMENT — LIFESTYLE VARIABLES
HOW MANY STANDARD DRINKS CONTAINING ALCOHOL DO YOU HAVE ON A TYPICAL DAY: PATIENT DOES NOT DRINK
HOW OFTEN DO YOU HAVE A DRINK CONTAINING ALCOHOL: NEVER

## 2024-02-29 NOTE — PATIENT INSTRUCTIONS
health problems such as diabetes, high blood pressure, and high cholesterol. If you think you may have a problem with alcohol or drug use, talk to your doctor.   Medicines    Take your medicines exactly as prescribed. Call your doctor if you think you are having a problem with your medicine.     If your doctor recommends aspirin, take the amount directed each day. Make sure you take aspirin and not another kind of pain reliever, such as acetaminophen (Tylenol).   When should you call for help?   Call 911 if you have symptoms of a heart attack. These may include:    Chest pain or pressure, or a strange feeling in the chest.     Sweating.     Shortness of breath.     Pain, pressure, or a strange feeling in the back, neck, jaw, or upper belly or in one or both shoulders or arms.     Lightheadedness or sudden weakness.     A fast or irregular heartbeat.   After you call 911, the  may tell you to chew 1 adult-strength or 2 to 4 low-dose aspirin. Wait for an ambulance. Do not try to drive yourself.  Watch closely for changes in your health, and be sure to contact your doctor if you have any problems.  Where can you learn more?  Go to https://www.[employer]net/patientEd and enter F075 to learn more about \"A Healthy Heart: Care Instructions.\"  Current as of: June 25, 2023               Content Version: 13.9  © 4610-6344 [employer].   Care instructions adapted under license by [employer]. If you have questions about a medical condition or this instruction, always ask your healthcare professional. [employer] disclaims any warranty or liability for your use of this information.      Personalized Preventive Plan for Kim Akers - 2/29/2024  Medicare offers a range of preventive health benefits. Some of the tests and screenings are paid in full while other may be subject to a deductible, co-insurance, and/or copay.    Some of these benefits include a comprehensive review of your medical

## 2024-02-29 NOTE — PROGRESS NOTES
Medicare Annual Wellness Visit      Kim Akers is here for Medicare AWV  IMPRESSION      ICD-10-CM    1. Medicare annual wellness visit, subsequent  Z00.00       2. Paroxysmal atrial fibrillation (HCC)  I48.0 TSH with Reflex     Comprehensive Metabolic Panel     CBC with Auto Differential     [place] OFFICE OUTPATIENT VISIT 15 MINUTES [31357]      3. Acute deep vein thrombosis (DVT) of popliteal vein of both lower extremities (HCC)  I82.433 [place] OFFICE OUTPATIENT VISIT 15 MINUTES [94051]      4. Stage 3a chronic kidney disease (HCC)  N18.31 [place] OFFICE OUTPATIENT VISIT 15 MINUTES [52099]      5. Secondary hypercoagulable state (HCC)  D68.69 [place] OFFICE OUTPATIENT VISIT 15 MINUTES [98191]      6. Gastroesophageal reflux disease without esophagitis  K21.9 [place] OFFICE OUTPATIENT VISIT 15 MINUTES [59666]      7. Seasonal allergies  J30.2       8. Family history of cerebrovascular accident  Z82.3              PLAN  Current Outpatient Medications   Medication Sig Dispense Refill    metoprolol succinate (TOPROL XL) 25 MG extended release tablet 1/2  Tab po daily 45 tablet 0    apixaban (ELIQUIS) 5 MG TABS tablet Take 1 tablet by mouth 2 times daily 180 tablet 1    famotidine (PEPCID) 20 MG tablet Take 1 tablet by mouth 2 times daily 180 tablet 1    Nutritional Supplements (JUICE PLUS FIBRE PO) Take by mouth      ondansetron (ZOFRAN) 4 MG tablet Take 1 tablet by mouth 3 times daily as needed for Nausea or Vomiting 21 tablet 1    loratadine (CLARITIN) 10 MG tablet Take 1 tablet by mouth daily       No current facility-administered medications for this visit.          No results found for this visit on 02/29/24.        Subjective       Patient's complete Health Risk Assessment and screening values have been reviewed and are found in Flowsheets. The following problems were reviewed today and where indicated follow up appointments were made and/or referrals ordered.    Positive Risk Factor Screenings with Interventions:

## 2024-03-24 ENCOUNTER — HOSPITAL ENCOUNTER (EMERGENCY)
Age: 74
Discharge: HOME | End: 2024-03-24
Payer: MEDICARE

## 2024-03-24 VITALS
DIASTOLIC BLOOD PRESSURE: 79 MMHG | OXYGEN SATURATION: 97 % | RESPIRATION RATE: 20 BRPM | SYSTOLIC BLOOD PRESSURE: 139 MMHG | WEIGHT: 186 LBS | HEIGHT: 60 IN | HEART RATE: 79 BPM | TEMPERATURE: 97.3 F | BODY MASS INDEX: 36.52 KG/M2

## 2024-03-24 DIAGNOSIS — J06.9 URI WITH COUGH AND CONGESTION: Primary | ICD-10-CM

## 2024-03-24 PROCEDURE — 99213 OFFICE O/P EST LOW 20 MIN: CPT

## 2024-03-24 PROCEDURE — 6370000000 HC RX 637 (ALT 250 FOR IP): Performed by: EMERGENCY MEDICINE

## 2024-03-24 PROCEDURE — 99213 OFFICE O/P EST LOW 20 MIN: CPT | Performed by: EMERGENCY MEDICINE

## 2024-03-24 RX ORDER — BENZONATATE 200 MG/1
200 CAPSULE ORAL 3 TIMES DAILY PRN
Qty: 30 CAPSULE | Refills: 0 | Status: SHIPPED | OUTPATIENT
Start: 2024-03-24 | End: 2024-03-31

## 2024-03-24 RX ORDER — AZITHROMYCIN 250 MG/1
TABLET, FILM COATED ORAL
Qty: 1 PACKET | Refills: 0 | Status: SHIPPED | OUTPATIENT
Start: 2024-03-24 | End: 2024-03-28

## 2024-03-24 RX ORDER — PREDNISONE 10 MG/1
20 TABLET ORAL ONCE
Status: COMPLETED | OUTPATIENT
Start: 2024-03-24 | End: 2024-03-24

## 2024-03-24 RX ORDER — PREDNISONE 20 MG/1
20 TABLET ORAL DAILY
Qty: 5 TABLET | Refills: 0 | Status: SHIPPED | OUTPATIENT
Start: 2024-03-24 | End: 2024-03-29

## 2024-03-24 RX ADMIN — PREDNISONE 20 MG: 10 TABLET ORAL at 17:55

## 2024-03-24 ASSESSMENT — PAIN - FUNCTIONAL ASSESSMENT: PAIN_FUNCTIONAL_ASSESSMENT: NONE - DENIES PAIN

## 2024-03-24 NOTE — DISCHARGE INSTRUCTIONS
Zithromax as directed until gone    Prednisone daily as directed    Tessalon as directed as needed for cough    Follow-up family physician return here if no significant improvement 3 days.  Return sooner for new or worsening symptoms

## 2024-03-24 NOTE — ED TRIAGE NOTES
TO room 3 with velia (1/2) c/o cough congestion fever, fatigue  Pt reports \" We were on cruise with sick children and I started with s/s 1 day before return from cruise\"  Repirations easy and unlabroed.  Pt reports \" Imallergic to a lot of antibiotics not sure ofnames\" diarrhea nad hives

## 2024-03-24 NOTE — ED PROVIDER NOTES
medications which have NOT CHANGED    Details   metoprolol succinate (TOPROL XL) 25 MG extended release tablet 1/2  Tab po daily, Disp-45 tablet, R-0Normal      apixaban (ELIQUIS) 5 MG TABS tablet Take 1 tablet by mouth 2 times daily, Disp-180 tablet, R-1Normal      famotidine (PEPCID) 20 MG tablet Take 1 tablet by mouth 2 times daily, Disp-180 tablet, R-1Normal      Nutritional Supplements (JUICE PLUS FIBRE PO) Take by mouthHistorical Med      ondansetron (ZOFRAN) 4 MG tablet Take 1 tablet by mouth 3 times daily as needed for Nausea or Vomiting, Disp-21 tablet, R-1Normal      loratadine (CLARITIN) 10 MG tablet Take 1 tablet by mouth dailyHistorical Med             ALLERGIES     Patient is is allergic to other, pcn [penicillins], zinc, and shellfish-derived products.    Patients   Immunization History   Administered Date(s) Administered    Hep A-Hep B, TWINRIX, (age 18y+), IM, 1mL 09/12/2013, 09/19/2013, 10/10/2013, 10/09/2014    TDaP, ADACEL (age 10y-64y), BOOSTRIX (age 10y+), IM, 0.5mL 09/12/2013       FAMILY HISTORY     Patient's family history includes Cancer in her father; Glaucoma in her mother; Stroke in her father.    SOCIAL HISTORY     Patient  reports that she has never smoked. She has never used smokeless tobacco. She reports that she does not drink alcohol and does not use drugs.    PHYSICAL EXAM     ED TRIAGE VITALS  BP: 139/79, Temp: 97.3 °F (36.3 °C), Pulse: 79, Respirations: 20, SpO2: 97 %,Estimated body mass index is 36.33 kg/m² as calculated from the following:    Height as of this encounter: 1.524 m (5').    Weight as of this encounter: 84.4 kg (186 lb).,No LMP recorded. Patient has had a hysterectomy.    Physical Exam  Constitutional:       Appearance: She is ill-appearing.   HENT:      Head: Normocephalic.      Nose: Congestion and rhinorrhea present.      Mouth/Throat:      Mouth: Mucous membranes are moist.      Pharynx: Oropharynx is clear. No oropharyngeal exudate.   Cardiovascular:       needed for Cough, Disp-30 capsule, R-0Normal             Discharge Medication List as of 3/24/2024  5:43 PM          Discharge Medication List as of 3/24/2024  5:43 PM          ANGLE Smith - CNP    (Please note that portions of this note were completed with a voice recognition program. Efforts were made to edit the dictations but occasionally words are mis-transcribed.)           Fredy Balbuena, APRN - CNP  03/24/24 [id number]

## 2024-05-31 DIAGNOSIS — I48.0 PAROXYSMAL ATRIAL FIBRILLATION (HCC): ICD-10-CM

## 2024-05-31 NOTE — TELEPHONE ENCOUNTER
Date of last visit:  2/29/2024  Date of next visit:  8/29/2024    Requested Prescriptions     Pending Prescriptions Disp Refills    metoprolol succinate (TOPROL XL) 25 MG extended release tablet [Pharmacy Med Name: Metoprolol Succinate ER 25 MG Oral Tablet Extended Release 24 Hour] 45 tablet 1     Sig: Take 1/2 (one-half) tablet by mouth once daily

## 2024-06-01 RX ORDER — METOPROLOL SUCCINATE 25 MG/1
TABLET, EXTENDED RELEASE ORAL
Qty: 45 TABLET | Refills: 1 | Status: SHIPPED | OUTPATIENT
Start: 2024-06-01

## 2024-06-17 NOTE — TELEPHONE ENCOUNTER
The pharmacy is  requesting a refill of the below medication which has been pended for you:     Requested Prescriptions     Pending Prescriptions Disp Refills    famotidine (PEPCID) 20 MG tablet [Pharmacy Med Name: Famotidine 20 MG Oral Tablet] 180 tablet 1     Sig: Take 1 tablet by mouth twice daily       Last Appointment Date: 2/29/2024  Next Appointment Date: 8/29/2024    Allergies   Allergen Reactions    Other      \" Im allergic to a lot of atb's  hives\"    Pcn [Penicillins] Hives    Zinc     Shellfish-Derived Products Diarrhea and Nausea And Vomiting

## 2024-06-18 RX ORDER — FAMOTIDINE 20 MG/1
20 TABLET, FILM COATED ORAL 2 TIMES DAILY
Qty: 180 TABLET | Refills: 1 | Status: SHIPPED | OUTPATIENT
Start: 2024-06-18

## 2024-07-22 DIAGNOSIS — I48.0 PAROXYSMAL ATRIAL FIBRILLATION (HCC): ICD-10-CM

## 2024-07-22 RX ORDER — APIXABAN 5 MG/1
5 TABLET, FILM COATED ORAL 2 TIMES DAILY
Qty: 180 TABLET | Refills: 0 | Status: SHIPPED | OUTPATIENT
Start: 2024-07-22

## 2024-09-03 ENCOUNTER — OFFICE VISIT (OUTPATIENT)
Dept: FAMILY MEDICINE CLINIC | Age: 74
End: 2024-09-03

## 2024-09-03 VITALS
SYSTOLIC BLOOD PRESSURE: 128 MMHG | HEIGHT: 60 IN | DIASTOLIC BLOOD PRESSURE: 84 MMHG | RESPIRATION RATE: 16 BRPM | WEIGHT: 190.25 LBS | HEART RATE: 84 BPM | BODY MASS INDEX: 37.35 KG/M2

## 2024-09-03 DIAGNOSIS — I48.0 PAROXYSMAL ATRIAL FIBRILLATION (HCC): Primary | ICD-10-CM

## 2024-09-03 DIAGNOSIS — N18.31 STAGE 3A CHRONIC KIDNEY DISEASE (HCC): ICD-10-CM

## 2024-09-03 DIAGNOSIS — D68.69 SECONDARY HYPERCOAGULABLE STATE (HCC): ICD-10-CM

## 2024-09-03 DIAGNOSIS — E66.01 SEVERE OBESITY (BMI 35.0-39.9) WITH COMORBIDITY (HCC): ICD-10-CM

## 2024-09-03 DIAGNOSIS — K58.0 IRRITABLE BOWEL SYNDROME WITH DIARRHEA: ICD-10-CM

## 2024-09-03 DIAGNOSIS — M75.52 ACUTE BURSITIS OF LEFT SHOULDER: ICD-10-CM

## 2024-09-03 DIAGNOSIS — E78.5 HYPERLIPIDEMIA, UNSPECIFIED HYPERLIPIDEMIA TYPE: ICD-10-CM

## 2024-09-03 DIAGNOSIS — K21.9 GASTROESOPHAGEAL REFLUX DISEASE WITHOUT ESOPHAGITIS: ICD-10-CM

## 2024-09-03 DIAGNOSIS — J30.2 SEASONAL ALLERGIES: ICD-10-CM

## 2024-09-03 DIAGNOSIS — E73.9 LACTOSE INTOLERANCE: ICD-10-CM

## 2024-09-03 RX ORDER — METOPROLOL SUCCINATE 25 MG/1
TABLET, EXTENDED RELEASE ORAL
Qty: 45 TABLET | Refills: 1 | Status: SHIPPED | OUTPATIENT
Start: 2024-09-03

## 2024-09-03 SDOH — ECONOMIC STABILITY: FOOD INSECURITY: WITHIN THE PAST 12 MONTHS, THE FOOD YOU BOUGHT JUST DIDN'T LAST AND YOU DIDN'T HAVE MONEY TO GET MORE.: NEVER TRUE

## 2024-09-03 SDOH — ECONOMIC STABILITY: INCOME INSECURITY: HOW HARD IS IT FOR YOU TO PAY FOR THE VERY BASICS LIKE FOOD, HOUSING, MEDICAL CARE, AND HEATING?: NOT HARD AT ALL

## 2024-09-03 SDOH — ECONOMIC STABILITY: FOOD INSECURITY: WITHIN THE PAST 12 MONTHS, YOU WORRIED THAT YOUR FOOD WOULD RUN OUT BEFORE YOU GOT MONEY TO BUY MORE.: NEVER TRUE

## 2024-09-03 ASSESSMENT — ENCOUNTER SYMPTOMS
WHEEZING: 0
NAUSEA: 0
SHORTNESS OF BREATH: 0
ABDOMINAL PAIN: 0
EYE PAIN: 0
CHEST TIGHTNESS: 0
CONSTIPATION: 0
SORE THROAT: 0
COUGH: 0

## 2024-09-03 NOTE — PROGRESS NOTES
Subjective   Patient ID: Kim Akers is a 74 y.o. female.      Atrial  fib  stable       Allergies   stabl e    Hypertension  This is a chronic problem. The current episode started more than 1 year ago. The problem has been resolved since onset. The problem is controlled. Pertinent negatives include no chest pain, headaches, neck pain, palpitations, peripheral edema or shortness of breath. The current treatment provides significant improvement. There are no compliance problems.    Gastroesophageal Reflux  She reports no abdominal pain, no chest pain, no coughing, no nausea, no sore throat or no wheezing. This is a chronic problem. The current episode started more than 1 year ago. The problem occurs rarely. The problem has been resolved. The symptoms are aggravated by certain foods. Pertinent negatives include no fatigue. She has tried a PPI for the symptoms. The treatment provided significant relief.     Past Medical History:   Diagnosis Date    Aortic regurgitation     Atrial fibrillation (HCC)     Diverticulosis of colon     Gastritis     GERD (gastroesophageal reflux disease)     IBS (irritable bowel syndrome)     Seasonal allergies     TMJ (dislocation of temporomandibular joint)         Review of Systems   Constitutional:  Negative for appetite change, fatigue and fever.   HENT:  Negative for congestion, ear pain, postnasal drip and sore throat.    Eyes:  Negative for pain and visual disturbance.   Respiratory:  Negative for cough, chest tightness, shortness of breath and wheezing.    Cardiovascular:  Negative for chest pain, palpitations and leg swelling.   Gastrointestinal:  Negative for abdominal pain, constipation and nausea.   Genitourinary:  Negative for dysuria and frequency.   Musculoskeletal:  Negative for arthralgias, joint swelling, neck pain and neck stiffness.   Skin:  Negative for rash.   Neurological:  Negative for dizziness, weakness, numbness and headaches.   Hematological:  Negative for

## 2024-10-15 DIAGNOSIS — I48.0 PAROXYSMAL ATRIAL FIBRILLATION (HCC): ICD-10-CM

## 2024-10-15 RX ORDER — APIXABAN 5 MG/1
5 TABLET, FILM COATED ORAL 2 TIMES DAILY
Qty: 180 TABLET | Refills: 1 | Status: SHIPPED | OUTPATIENT
Start: 2024-10-15

## 2024-10-15 NOTE — TELEPHONE ENCOUNTER
Date of last visit:  9/3/2024  Date of next visit:  4/8/2025    Requested Prescriptions     Pending Prescriptions Disp Refills    apixaban (ELIQUIS) 5 MG TABS tablet [Pharmacy Med Name: Eliquis 5 MG Oral Tablet] 180 tablet 1     Sig: Take 1 tablet by mouth twice daily

## 2024-10-29 ENCOUNTER — LAB (OUTPATIENT)
Dept: LAB | Age: 74
End: 2024-10-29

## 2024-10-29 DIAGNOSIS — E78.5 HYPERLIPIDEMIA, UNSPECIFIED HYPERLIPIDEMIA TYPE: ICD-10-CM

## 2024-10-29 DIAGNOSIS — I48.0 PAROXYSMAL ATRIAL FIBRILLATION (HCC): ICD-10-CM

## 2024-10-29 LAB
CHOLEST SERPL-MCNC: 180 MG/DL (ref 100–199)
HDLC SERPL-MCNC: 52 MG/DL
LDLC SERPL CALC-MCNC: 106 MG/DL
TRIGL SERPL-MCNC: 110 MG/DL (ref 0–199)

## 2024-10-30 ENCOUNTER — TELEPHONE (OUTPATIENT)
Dept: FAMILY MEDICINE CLINIC | Age: 74
End: 2024-10-30

## 2024-10-30 NOTE — TELEPHONE ENCOUNTER
----- Message from Dr. Shola Tellez MD sent at 10/30/2024 12:57 AM EDT -----  Chol at 180 and ldl 106 so stable    Please call . Goal is ldl less 100 so just watch diet

## 2024-11-01 ENCOUNTER — OFFICE VISIT (OUTPATIENT)
Dept: CARDIOLOGY CLINIC | Age: 74
End: 2024-11-01

## 2024-11-01 VITALS
WEIGHT: 191 LBS | HEART RATE: 77 BPM | SYSTOLIC BLOOD PRESSURE: 142 MMHG | BODY MASS INDEX: 37.5 KG/M2 | HEIGHT: 60 IN | DIASTOLIC BLOOD PRESSURE: 74 MMHG

## 2024-11-01 DIAGNOSIS — I48.0 PAROXYSMAL ATRIAL FIBRILLATION (HCC): Primary | ICD-10-CM

## 2024-11-01 NOTE — PATIENT INSTRUCTIONS
Your nurses today were Jackie   Your provider today was Dr. Cordoba  Phone number: [phone]

## 2024-11-01 NOTE — PROGRESS NOTES
[facility] [place] [facility]  [facility]  [address]  [address]  [place] [address]  Dept: [phone]  Dept Fax: [phone]  Loc: [phone]    Visit Date: 2024    Kim Akers is a 74 y.o. female who presents todayfor:  Chief Complaint   Patient presents with    1 Year Follow Up    Atrial Fibrillation   Know pAF  On medical Rx  No chest pain  No changes in breathing  She is active  BP is stable  No dizziness  No syncope  No bleeding       HPI:  HPI  Past Medical History:   Diagnosis Date    Aortic regurgitation     Atrial fibrillation (HCC)     Diverticulosis of colon     Gastritis     GERD (gastroesophageal reflux disease)     IBS (irritable bowel syndrome)     Seasonal allergies     TMJ (dislocation of temporomandibular joint)       Past Surgical History:   Procedure Laterality Date    APPENDECTOMY       SECTION       SECTION      COLONOSCOPY      [place]    HYSTERECTOMY (CERVIX STATUS UNKNOWN)  1994    bso    MANDIBLE SURGERY       Family History   Problem Relation Age of Onset    Glaucoma Mother     Stroke Father     Cancer Father         colon     Social History     Tobacco Use    Smoking status: Never     Passive exposure: Never    Smokeless tobacco: Never   Substance Use Topics    Alcohol use: No      Current Outpatient Medications   Medication Sig Dispense Refill    ELIQUIS 5 MG TABS tablet Take 1 tablet by mouth twice daily 180 tablet 1    metoprolol succinate (TOPROL XL) 25 MG extended release tablet Take 1/2 (one-half) tablet by mouth once daily 45 tablet 1    famotidine (PEPCID) 20 MG tablet Take 1 tablet by mouth twice daily 180 tablet 1    Nutritional Supplements (JUICE PLUS FIBRE PO) Take by mouth      ondansetron (ZOFRAN) 4 MG tablet Take 1 tablet by mouth 3 times daily as needed for Nausea or Vomiting 21 tablet 1    loratadine (CLARITIN) 10 MG tablet Take 1 tablet by mouth daily       No current

## 2024-11-14 NOTE — TELEPHONE ENCOUNTER
Date of last visit:  2/29/2024  Date of next visit:  8/29/2024    Requested Prescriptions     Pending Prescriptions Disp Refills    ELIQUIS 5 MG TABS tablet [Pharmacy Med Name: Eliquis 5 MG Oral Tablet] 180 tablet 0     Sig: Take 1 tablet by mouth twice daily      Head, normocephalic, atraumatic, Face, Face within normal limits

## 2024-12-10 DIAGNOSIS — I48.0 PAROXYSMAL ATRIAL FIBRILLATION (HCC): ICD-10-CM

## 2024-12-11 RX ORDER — METOPROLOL SUCCINATE 25 MG/1
TABLET, EXTENDED RELEASE ORAL
Qty: 45 TABLET | Refills: 1 | Status: SHIPPED | OUTPATIENT
Start: 2024-12-11

## 2024-12-11 NOTE — TELEPHONE ENCOUNTER
Date of last visit:  9/3/2024  Date of next visit:  4/8/2025    Requested Prescriptions     Pending Prescriptions Disp Refills    metoprolol succinate (TOPROL XL) 25 MG extended release tablet 45 tablet 1     Sig: Take 1/2 (one-half) tablet by mouth once daily

## 2025-01-30 RX ORDER — FAMOTIDINE 20 MG/1
20 TABLET, FILM COATED ORAL 2 TIMES DAILY
Qty: 180 TABLET | Refills: 1 | Status: SHIPPED | OUTPATIENT
Start: 2025-01-30

## 2025-01-30 NOTE — TELEPHONE ENCOUNTER
The pharmacy is  requesting a refill of the below medication which has been pended for you:     Requested Prescriptions     Pending Prescriptions Disp Refills    famotidine (PEPCID) 20 MG tablet [Pharmacy Med Name: Famotidine 20 MG Oral Tablet] 180 tablet 1     Sig: Take 1 tablet by mouth twice daily       Last Appointment Date: 9/3/2024  Next Appointment Date: 4/8/2025    Allergies   Allergen Reactions    Other      \" Im allergic to a lot of atb's  hives\"    Pcn [Penicillins] Hives    Zinc     Shellfish-Derived Products Diarrhea and Nausea And Vomiting

## 2025-05-01 DIAGNOSIS — I48.0 PAROXYSMAL ATRIAL FIBRILLATION (HCC): ICD-10-CM

## 2025-05-01 NOTE — TELEPHONE ENCOUNTER
Date of last visit:  9/3/2024  Date of next visit:  Visit date not found    Requested Prescriptions     Pending Prescriptions Disp Refills    apixaban (ELIQUIS) 5 MG TABS tablet 180 tablet 1     Sig: Take 1 tablet by mouth 2 times daily

## 2025-05-01 NOTE — TELEPHONE ENCOUNTER
Spouse called req a #90 refill for   Eliquis 5 mg 1 tab BID.    Sunshine Bernal informed appt is due.    He stated he will have pt call to schedule wellness

## 2025-05-07 ENCOUNTER — OFFICE VISIT (OUTPATIENT)
Dept: FAMILY MEDICINE CLINIC | Age: 75
End: 2025-05-07

## 2025-05-07 VITALS
HEIGHT: 59 IN | WEIGHT: 192 LBS | DIASTOLIC BLOOD PRESSURE: 82 MMHG | BODY MASS INDEX: 38.71 KG/M2 | SYSTOLIC BLOOD PRESSURE: 134 MMHG | RESPIRATION RATE: 16 BRPM | HEART RATE: 76 BPM

## 2025-05-07 DIAGNOSIS — D68.69 SECONDARY HYPERCOAGULABLE STATE: ICD-10-CM

## 2025-05-07 DIAGNOSIS — Z00.00 MEDICARE ANNUAL WELLNESS VISIT, SUBSEQUENT: Primary | ICD-10-CM

## 2025-05-07 DIAGNOSIS — J30.2 SEASONAL ALLERGIES: ICD-10-CM

## 2025-05-07 DIAGNOSIS — K21.9 GASTROESOPHAGEAL REFLUX DISEASE WITHOUT ESOPHAGITIS: ICD-10-CM

## 2025-05-07 DIAGNOSIS — I48.0 PAROXYSMAL ATRIAL FIBRILLATION (HCC): ICD-10-CM

## 2025-05-07 DIAGNOSIS — K57.30 DIVERTICULOSIS OF COLON: ICD-10-CM

## 2025-05-07 DIAGNOSIS — E78.5 HYPERLIPIDEMIA, UNSPECIFIED HYPERLIPIDEMIA TYPE: ICD-10-CM

## 2025-05-07 DIAGNOSIS — K58.0 IRRITABLE BOWEL SYNDROME WITH DIARRHEA: ICD-10-CM

## 2025-05-07 DIAGNOSIS — N18.31 STAGE 3A CHRONIC KIDNEY DISEASE (HCC): ICD-10-CM

## 2025-05-07 DIAGNOSIS — E66.812 CLASS 2 OBESITY WITHOUT SERIOUS COMORBIDITY WITH BODY MASS INDEX (BMI) OF 38.0 TO 38.9 IN ADULT, UNSPECIFIED OBESITY TYPE: ICD-10-CM

## 2025-05-07 DIAGNOSIS — R53.83 FATIGUE, UNSPECIFIED TYPE: ICD-10-CM

## 2025-05-07 PROCEDURE — G0439 PPPS, SUBSEQ VISIT: HCPCS | Performed by: FAMILY MEDICINE

## 2025-05-07 PROCEDURE — G8427 DOCREV CUR MEDS BY ELIG CLIN: HCPCS | Performed by: FAMILY MEDICINE

## 2025-05-07 PROCEDURE — 1123F ACP DISCUSS/DSCN MKR DOCD: CPT | Performed by: FAMILY MEDICINE

## 2025-05-07 PROCEDURE — 99213 OFFICE O/P EST LOW 20 MIN: CPT | Performed by: FAMILY MEDICINE

## 2025-05-07 PROCEDURE — 3017F COLORECTAL CA SCREEN DOC REV: CPT | Performed by: FAMILY MEDICINE

## 2025-05-07 PROCEDURE — G8400 PT W/DXA NO RESULTS DOC: HCPCS | Performed by: FAMILY MEDICINE

## 2025-05-07 PROCEDURE — 1036F TOBACCO NON-USER: CPT | Performed by: FAMILY MEDICINE

## 2025-05-07 PROCEDURE — G8417 CALC BMI ABV UP PARAM F/U: HCPCS | Performed by: FAMILY MEDICINE

## 2025-05-07 RX ORDER — METOPROLOL SUCCINATE 25 MG/1
TABLET, EXTENDED RELEASE ORAL
Qty: 45 TABLET | Refills: 1 | Status: SHIPPED | OUTPATIENT
Start: 2025-05-07

## 2025-05-07 SDOH — ECONOMIC STABILITY: FOOD INSECURITY: WITHIN THE PAST 12 MONTHS, YOU WORRIED THAT YOUR FOOD WOULD RUN OUT BEFORE YOU GOT MONEY TO BUY MORE.: NEVER TRUE

## 2025-05-07 SDOH — ECONOMIC STABILITY: FOOD INSECURITY: WITHIN THE PAST 12 MONTHS, THE FOOD YOU BOUGHT JUST DIDN'T LAST AND YOU DIDN'T HAVE MONEY TO GET MORE.: NEVER TRUE

## 2025-05-07 ASSESSMENT — PATIENT HEALTH QUESTIONNAIRE - PHQ9
2. FEELING DOWN, DEPRESSED OR HOPELESS: NOT AT ALL
5. POOR APPETITE OR OVEREATING: NOT AT ALL
10. IF YOU CHECKED OFF ANY PROBLEMS, HOW DIFFICULT HAVE THESE PROBLEMS MADE IT FOR YOU TO DO YOUR WORK, TAKE CARE OF THINGS AT HOME, OR GET ALONG WITH OTHER PEOPLE: NOT DIFFICULT AT ALL
SUM OF ALL RESPONSES TO PHQ QUESTIONS 1-9: 0
SUM OF ALL RESPONSES TO PHQ QUESTIONS 1-9: 0
7. TROUBLE CONCENTRATING ON THINGS, SUCH AS READING THE NEWSPAPER OR WATCHING TELEVISION: NOT AT ALL
8. MOVING OR SPEAKING SO SLOWLY THAT OTHER PEOPLE COULD HAVE NOTICED. OR THE OPPOSITE, BEING SO FIGETY OR RESTLESS THAT YOU HAVE BEEN MOVING AROUND A LOT MORE THAN USUAL: NOT AT ALL
3. TROUBLE FALLING OR STAYING ASLEEP: NOT AT ALL
6. FEELING BAD ABOUT YOURSELF - OR THAT YOU ARE A FAILURE OR HAVE LET YOURSELF OR YOUR FAMILY DOWN: NOT AT ALL
SUM OF ALL RESPONSES TO PHQ QUESTIONS 1-9: 0
1. LITTLE INTEREST OR PLEASURE IN DOING THINGS: NOT AT ALL
9. THOUGHTS THAT YOU WOULD BE BETTER OFF DEAD, OR OF HURTING YOURSELF: NOT AT ALL
SUM OF ALL RESPONSES TO PHQ QUESTIONS 1-9: 0
4. FEELING TIRED OR HAVING LITTLE ENERGY: NOT AT ALL

## 2025-05-07 ASSESSMENT — ENCOUNTER SYMPTOMS
COUGH: 0
CONSTIPATION: 0
NAUSEA: 0
ABDOMINAL PAIN: 0
SORE THROAT: 0
EYE PAIN: 0
SHORTNESS OF BREATH: 0
CHEST TIGHTNESS: 0
WHEEZING: 0

## 2025-05-07 ASSESSMENT — LIFESTYLE VARIABLES
HOW OFTEN DO YOU HAVE A DRINK CONTAINING ALCOHOL: NEVER
HOW MANY STANDARD DRINKS CONTAINING ALCOHOL DO YOU HAVE ON A TYPICAL DAY: PATIENT DOES NOT DRINK

## 2025-05-07 NOTE — PROGRESS NOTES
Subjective   Patient ID: Kim Akers is a 75 y.o. female.    HPI     Allergies   noted   spring and  stable        Stools   better       IBS   stable      Dvt  in      due  to  covid     Par  atrial  fib   stable     Past Medical History:   Diagnosis Date    Aortic regurgitation     Atrial fibrillation (HCC)     Diverticulosis of colon     Gastritis     GERD (gastroesophageal reflux disease)     IBS (irritable bowel syndrome)     Seasonal allergies     TMJ (dislocation of temporomandibular joint)      Past Surgical History:   Procedure Laterality Date    APPENDECTOMY       SECTION       SECTION      COLONOSCOPY      [place]    HYSTERECTOMY (CERVIX STATUS UNKNOWN)  1994    bso    MANDIBLE SURGERY       Social History     Socioeconomic History    Marital status:      Spouse name: Not on file    Number of children: Not on file    Years of education: Not on file    Highest education level: Not on file   Occupational History    Not on file   Tobacco Use    Smoking status: Never     Passive exposure: Never    Smokeless tobacco: Never   Substance and Sexual Activity    Alcohol use: No    Drug use: No    Sexual activity: Defer     Partners: Male   Other Topics Concern    Not on file   Social History Narrative    Not on file     Social Drivers of Health     Financial Resource Strain: Low Risk  (9/3/2024)    Overall Financial Resource Strain (CARDIA)     Difficulty of Paying Living Expenses: Not hard at all   Food Insecurity: No Food Insecurity (2025)    Hunger Vital Sign     Worried About Running Out of Food in the Last Year: Never true     Ran Out of Food in the Last Year: Never true   Transportation Needs: No Transportation Needs (2025)    PRAPARE - Transportation     Lack of Transportation (Medical): No     Lack of Transportation (Non-Medical): No   Physical Activity: Inactive (2025)    Exercise Vital Sign     Days of Exercise per Week: 0 days     Minutes

## 2025-07-09 DIAGNOSIS — I48.0 PAROXYSMAL ATRIAL FIBRILLATION (HCC): ICD-10-CM

## 2025-07-10 RX ORDER — METOPROLOL SUCCINATE 25 MG/1
TABLET, EXTENDED RELEASE ORAL
Qty: 45 TABLET | Refills: 1 | Status: SHIPPED | OUTPATIENT
Start: 2025-07-10

## 2025-07-10 NOTE — TELEPHONE ENCOUNTER
The pharmacy is requesting a refill of the below medication which has been pended for you:     Requested Prescriptions     Pending Prescriptions Disp Refills    metoprolol succinate (TOPROL XL) 25 MG extended release tablet 45 tablet 1     Sig: Take 1/2 (one-half) tablet by mouth once daily       Last Appointment Date: 5/7/2025  Next Appointment Date: Visit date not found    Allergies   Allergen Reactions    Other      \" Im allergic to a lot of atb's  hives\"    Pcn [Penicillins] Hives    Zinc     Shellfish-Derived Products Diarrhea and Nausea And Vomiting

## 2025-08-11 DIAGNOSIS — I48.0 PAROXYSMAL ATRIAL FIBRILLATION (HCC): ICD-10-CM

## 2025-08-11 RX ORDER — FAMOTIDINE 20 MG/1
20 TABLET, FILM COATED ORAL 2 TIMES DAILY
Qty: 180 TABLET | Refills: 1 | Status: SHIPPED | OUTPATIENT
Start: 2025-08-11

## 2025-08-30 ENCOUNTER — HOSPITAL ENCOUNTER (EMERGENCY)
Age: 75
Discharge: HOME | End: 2025-08-30
Payer: MEDICARE

## 2025-08-30 ENCOUNTER — APPOINTMENT (OUTPATIENT)
Dept: GENERAL RADIOLOGY | Age: 75
End: 2025-08-30
Payer: MEDICARE

## 2025-08-30 VITALS
BODY MASS INDEX: 37.57 KG/M2 | HEART RATE: 72 BPM | WEIGHT: 186 LBS | RESPIRATION RATE: 20 BRPM | OXYGEN SATURATION: 99 % | TEMPERATURE: 97.7 F

## 2025-08-30 DIAGNOSIS — M79.642 HAND PAIN, LEFT: Primary | ICD-10-CM

## 2025-08-30 PROCEDURE — 99213 OFFICE O/P EST LOW 20 MIN: CPT

## 2025-08-30 PROCEDURE — 73130 X-RAY EXAM OF HAND: CPT

## 2025-08-30 ASSESSMENT — PAIN - FUNCTIONAL ASSESSMENT: PAIN_FUNCTIONAL_ASSESSMENT: 0-10

## 2025-08-30 ASSESSMENT — PAIN DESCRIPTION - DESCRIPTORS: DESCRIPTORS: ACHING;SHARP;SHOOTING

## 2025-08-30 ASSESSMENT — PAIN SCALES - GENERAL: PAINLEVEL_OUTOF10: 8

## 2025-08-30 ASSESSMENT — PAIN DESCRIPTION - ORIENTATION: ORIENTATION: LEFT

## 2025-08-30 ASSESSMENT — ENCOUNTER SYMPTOMS: COUGH: 0

## 2025-08-30 ASSESSMENT — PAIN DESCRIPTION - LOCATION: LOCATION: HAND

## 2025-08-30 ASSESSMENT — PAIN DESCRIPTION - FREQUENCY: FREQUENCY: INTERMITTENT
